# Patient Record
Sex: FEMALE | Race: WHITE | NOT HISPANIC OR LATINO | Employment: OTHER | URBAN - METROPOLITAN AREA
[De-identification: names, ages, dates, MRNs, and addresses within clinical notes are randomized per-mention and may not be internally consistent; named-entity substitution may affect disease eponyms.]

---

## 2017-01-16 ENCOUNTER — ALLSCRIPTS OFFICE VISIT (OUTPATIENT)
Dept: OTHER | Facility: OTHER | Age: 82
End: 2017-01-16

## 2017-02-04 ENCOUNTER — ALLSCRIPTS OFFICE VISIT (OUTPATIENT)
Dept: OTHER | Facility: OTHER | Age: 82
End: 2017-02-04

## 2017-02-24 ENCOUNTER — TRANSCRIBE ORDERS (OUTPATIENT)
Dept: ADMINISTRATIVE | Facility: HOSPITAL | Age: 82
End: 2017-02-24

## 2017-02-24 ENCOUNTER — ALLSCRIPTS OFFICE VISIT (OUTPATIENT)
Dept: OTHER | Facility: OTHER | Age: 82
End: 2017-02-24

## 2017-02-24 ENCOUNTER — HOSPITAL ENCOUNTER (OUTPATIENT)
Dept: RADIOLOGY | Facility: HOSPITAL | Age: 82
Discharge: HOME | End: 2017-02-24
Payer: COMMERCIAL

## 2017-02-24 DIAGNOSIS — I48.0 PAROXYSMAL ATRIAL FIBRILLATION (HCC): ICD-10-CM

## 2017-02-24 DIAGNOSIS — M54.6 PAIN IN THORACIC SPINE: ICD-10-CM

## 2017-02-24 PROCEDURE — 72070 X-RAY EXAM THORAC SPINE 2VWS: CPT

## 2017-02-27 ENCOUNTER — ALLSCRIPTS OFFICE VISIT (OUTPATIENT)
Dept: OTHER | Facility: OTHER | Age: 82
End: 2017-02-27

## 2017-03-01 ENCOUNTER — APPOINTMENT (EMERGENCY)
Dept: RADIOLOGY | Facility: HOSPITAL | Age: 82
DRG: 308 | End: 2017-03-01
Payer: COMMERCIAL

## 2017-03-01 ENCOUNTER — HOSPITAL ENCOUNTER (INPATIENT)
Facility: HOSPITAL | Age: 82
LOS: 3 days | Discharge: HOME HEALTH | DRG: 308 | End: 2017-03-04
Attending: INTERNAL MEDICINE | Admitting: INTERNAL MEDICINE
Payer: COMMERCIAL

## 2017-03-01 DIAGNOSIS — I50.32 CHRONIC DIASTOLIC CONGESTIVE HEART FAILURE (HCC): ICD-10-CM

## 2017-03-01 DIAGNOSIS — K59.00 CONSTIPATION: ICD-10-CM

## 2017-03-01 DIAGNOSIS — I48.91 ATRIAL FIBRILLATION WITH RVR (HCC): ICD-10-CM

## 2017-03-01 DIAGNOSIS — I10 ESSENTIAL HYPERTENSION WITH GOAL BLOOD PRESSURE LESS THAN 130/85: ICD-10-CM

## 2017-03-01 DIAGNOSIS — E87.1 HYPONATREMIA: Primary | ICD-10-CM

## 2017-03-01 DIAGNOSIS — I48.91 NEW ONSET A-FIB (HCC): ICD-10-CM

## 2017-03-01 PROBLEM — M48.54XA NON-TRAUMATIC COMPRESSION FRACTURE OF T10 THORACIC VERTEBRA: Status: ACTIVE | Noted: 2017-03-01

## 2017-03-01 LAB
ALBUMIN SERPL BCP-MCNC: 3.3 G/DL (ref 3.5–5)
ALP SERPL-CCNC: 98 U/L (ref 46–116)
ALT SERPL W P-5'-P-CCNC: 29 U/L (ref 12–78)
ANION GAP SERPL CALCULATED.3IONS-SCNC: 3 MMOL/L (ref 4–13)
APTT PPP: 32 SECONDS (ref 24–36)
AST SERPL W P-5'-P-CCNC: 31 U/L (ref 5–45)
BASOPHILS # BLD AUTO: 0.03 THOUSANDS/ΜL (ref 0–0.1)
BASOPHILS NFR BLD AUTO: 0 % (ref 0–1)
BILIRUB SERPL-MCNC: 0.6 MG/DL (ref 0.2–1)
BUN SERPL-MCNC: 16 MG/DL (ref 5–25)
CALCIUM SERPL-MCNC: 9.4 MG/DL (ref 8.3–10.1)
CHLORIDE SERPL-SCNC: 87 MMOL/L (ref 100–108)
CLARITY, POC: CLEAR
CO2 SERPL-SCNC: 34 MMOL/L (ref 21–32)
COLOR, POC: YELLOW
CREAT SERPL-MCNC: 0.86 MG/DL (ref 0.6–1.3)
EOSINOPHIL # BLD AUTO: 0.15 THOUSAND/ΜL (ref 0–0.61)
EOSINOPHIL NFR BLD AUTO: 2 % (ref 0–6)
ERYTHROCYTE [DISTWIDTH] IN BLOOD BY AUTOMATED COUNT: 12.7 % (ref 11.6–15.1)
EXT BILIRUBIN, UA: NEGATIVE
EXT BLOOD URINE: NEGATIVE
EXT GLUCOSE, UA: NEGATIVE
EXT KETONES: NEGATIVE
EXT NITRITE, UA: NEGATIVE
EXT PH, UA: 7.5
EXT PROTEIN, UA: NEGATIVE
EXT SPECIFIC GRAVITY, UA: 1
EXT UROBILINOGEN: 0.2
GFR SERPL CREATININE-BSD FRML MDRD: >60 ML/MIN/1.73SQ M
GLUCOSE SERPL-MCNC: 105 MG/DL (ref 65–140)
HCT VFR BLD AUTO: 37.1 % (ref 34.8–46.1)
HGB BLD-MCNC: 12.8 G/DL (ref 11.5–15.4)
INR PPP: 1.03 (ref 0.86–1.16)
LACTATE SERPL-SCNC: 1.2 MMOL/L (ref 0.5–2)
LYMPHOCYTES # BLD AUTO: 1.15 THOUSANDS/ΜL (ref 0.6–4.47)
LYMPHOCYTES NFR BLD AUTO: 16 % (ref 14–44)
MCH RBC QN AUTO: 30.4 PG (ref 26.8–34.3)
MCHC RBC AUTO-ENTMCNC: 34.5 G/DL (ref 31.4–37.4)
MCV RBC AUTO: 88 FL (ref 82–98)
MONOCYTES # BLD AUTO: 1.12 THOUSAND/ΜL (ref 0.17–1.22)
MONOCYTES NFR BLD AUTO: 16 % (ref 4–12)
NEUTROPHILS # BLD AUTO: 4.76 THOUSANDS/ΜL (ref 1.85–7.62)
NEUTS SEG NFR BLD AUTO: 66 % (ref 43–75)
NT-PROBNP SERPL-MCNC: 1286 PG/ML
PLATELET # BLD AUTO: 256 THOUSANDS/UL (ref 149–390)
PMV BLD AUTO: 10.7 FL (ref 8.9–12.7)
POTASSIUM SERPL-SCNC: 3.7 MMOL/L (ref 3.5–5.3)
PROT SERPL-MCNC: 7.2 G/DL (ref 6.4–8.2)
PROTHROMBIN TIME: 13.4 SECONDS (ref 12–14.3)
RBC # BLD AUTO: 4.21 MILLION/UL (ref 3.81–5.12)
SODIUM SERPL-SCNC: 124 MMOL/L (ref 136–145)
WBC # BLD AUTO: 7.21 THOUSAND/UL (ref 4.31–10.16)
WBC # BLD EST: NEGATIVE 10*3/UL

## 2017-03-01 PROCEDURE — A9270 NON-COVERED ITEM OR SERVICE: HCPCS | Performed by: NURSE PRACTITIONER

## 2017-03-01 PROCEDURE — 83880 ASSAY OF NATRIURETIC PEPTIDE: CPT | Performed by: PHYSICIAN ASSISTANT

## 2017-03-01 PROCEDURE — 93005 ELECTROCARDIOGRAM TRACING: CPT | Performed by: NURSE PRACTITIONER

## 2017-03-01 PROCEDURE — 99285 EMERGENCY DEPT VISIT HI MDM: CPT

## 2017-03-01 PROCEDURE — 93005 ELECTROCARDIOGRAM TRACING: CPT | Performed by: PHYSICIAN ASSISTANT

## 2017-03-01 PROCEDURE — 85610 PROTHROMBIN TIME: CPT | Performed by: PHYSICIAN ASSISTANT

## 2017-03-01 PROCEDURE — 83605 ASSAY OF LACTIC ACID: CPT | Performed by: PHYSICIAN ASSISTANT

## 2017-03-01 PROCEDURE — 84295 ASSAY OF SERUM SODIUM: CPT | Performed by: NURSE PRACTITIONER

## 2017-03-01 PROCEDURE — 80053 COMPREHEN METABOLIC PANEL: CPT | Performed by: PHYSICIAN ASSISTANT

## 2017-03-01 PROCEDURE — 96376 TX/PRO/DX INJ SAME DRUG ADON: CPT

## 2017-03-01 PROCEDURE — 81002 URINALYSIS NONAUTO W/O SCOPE: CPT | Performed by: PHYSICIAN ASSISTANT

## 2017-03-01 PROCEDURE — 74022 RADEX COMPL AQT ABD SERIES: CPT

## 2017-03-01 PROCEDURE — 85730 THROMBOPLASTIN TIME PARTIAL: CPT | Performed by: PHYSICIAN ASSISTANT

## 2017-03-01 PROCEDURE — 96365 THER/PROPH/DIAG IV INF INIT: CPT

## 2017-03-01 PROCEDURE — 36415 COLL VENOUS BLD VENIPUNCTURE: CPT | Performed by: PHYSICIAN ASSISTANT

## 2017-03-01 PROCEDURE — 85025 COMPLETE CBC W/AUTO DIFF WBC: CPT | Performed by: PHYSICIAN ASSISTANT

## 2017-03-01 RX ORDER — BRIMONIDINE TARTRATE 2 MG/ML
1 SOLUTION/ DROPS OPHTHALMIC EVERY 12 HOURS SCHEDULED
Status: DISCONTINUED | OUTPATIENT
Start: 2017-03-02 | End: 2017-03-04 | Stop reason: HOSPADM

## 2017-03-01 RX ORDER — ASPIRIN 81 MG/1
81 TABLET ORAL DAILY
Status: DISCONTINUED | OUTPATIENT
Start: 2017-03-02 | End: 2017-03-03

## 2017-03-01 RX ORDER — LEVOTHYROXINE SODIUM 0.03 MG/1
25 TABLET ORAL
Status: DISCONTINUED | OUTPATIENT
Start: 2017-03-02 | End: 2017-03-04 | Stop reason: HOSPADM

## 2017-03-01 RX ORDER — ASCORBIC ACID 500 MG
500 TABLET ORAL DAILY
Status: DISCONTINUED | OUTPATIENT
Start: 2017-03-02 | End: 2017-03-04 | Stop reason: HOSPADM

## 2017-03-01 RX ORDER — LANSOPRAZOLE 30 MG/1
30 CAPSULE, DELAYED RELEASE ORAL DAILY
COMMUNITY
End: 2018-06-18

## 2017-03-01 RX ORDER — HYDROCHLOROTHIAZIDE 12.5 MG/1
12.5 CAPSULE, GELATIN COATED ORAL
COMMUNITY
End: 2017-03-04 | Stop reason: HOSPADM

## 2017-03-01 RX ORDER — BRIMONIDINE TARTRATE, TIMOLOL MALEATE 2; 5 MG/ML; MG/ML
1 SOLUTION/ DROPS OPHTHALMIC EVERY 12 HOURS SCHEDULED
Status: DISCONTINUED | OUTPATIENT
Start: 2017-03-01 | End: 2017-03-01

## 2017-03-01 RX ORDER — TIMOLOL MALEATE 5 MG/ML
1 SOLUTION/ DROPS OPHTHALMIC EVERY 12 HOURS SCHEDULED
Status: DISCONTINUED | OUTPATIENT
Start: 2017-03-02 | End: 2017-03-04 | Stop reason: HOSPADM

## 2017-03-01 RX ORDER — DOCUSATE SODIUM 100 MG/1
100 CAPSULE, LIQUID FILLED ORAL 2 TIMES DAILY
Status: DISCONTINUED | OUTPATIENT
Start: 2017-03-01 | End: 2017-03-02

## 2017-03-01 RX ORDER — DORZOLAMIDE HCL 20 MG/ML
1 SOLUTION/ DROPS OPHTHALMIC 2 TIMES DAILY
COMMUNITY
End: 2017-03-01

## 2017-03-01 RX ORDER — SENNOSIDES 8.6 MG
2 TABLET ORAL DAILY
Status: DISCONTINUED | OUTPATIENT
Start: 2017-03-02 | End: 2017-03-02

## 2017-03-01 RX ORDER — PANTOPRAZOLE SODIUM 40 MG/1
40 TABLET, DELAYED RELEASE ORAL
Status: DISCONTINUED | OUTPATIENT
Start: 2017-03-02 | End: 2017-03-04 | Stop reason: HOSPADM

## 2017-03-01 RX ORDER — CYCLOBENZAPRINE HCL 10 MG
5 TABLET ORAL EVERY 8 HOURS PRN
Status: DISCONTINUED | OUTPATIENT
Start: 2017-03-01 | End: 2017-03-04 | Stop reason: HOSPADM

## 2017-03-01 RX ORDER — LIDOCAINE 50 MG/G
1 PATCH TOPICAL DAILY
Status: DISCONTINUED | OUTPATIENT
Start: 2017-03-02 | End: 2017-03-04 | Stop reason: HOSPADM

## 2017-03-01 RX ORDER — SODIUM CHLORIDE 9 MG/ML
75 INJECTION, SOLUTION INTRAVENOUS CONTINUOUS
Status: DISCONTINUED | OUTPATIENT
Start: 2017-03-01 | End: 2017-03-02

## 2017-03-01 RX ORDER — DILTIAZEM HYDROCHLORIDE 5 MG/ML
10 INJECTION INTRAVENOUS ONCE
Status: COMPLETED | OUTPATIENT
Start: 2017-03-01 | End: 2017-03-01

## 2017-03-01 RX ORDER — TIMOLOL MALEATE 5 MG/ML
1 SOLUTION/ DROPS OPHTHALMIC EVERY 12 HOURS SCHEDULED
Status: DISCONTINUED | OUTPATIENT
Start: 2017-03-01 | End: 2017-03-01

## 2017-03-01 RX ORDER — VALSARTAN 160 MG/1
160 TABLET ORAL DAILY
Status: DISCONTINUED | OUTPATIENT
Start: 2017-03-02 | End: 2017-03-02

## 2017-03-01 RX ORDER — ATORVASTATIN CALCIUM 40 MG/1
40 TABLET, FILM COATED ORAL
Status: DISCONTINUED | OUTPATIENT
Start: 2017-03-02 | End: 2017-03-04 | Stop reason: HOSPADM

## 2017-03-01 RX ORDER — LATANOPROST 50 UG/ML
1 SOLUTION/ DROPS OPHTHALMIC
Status: DISCONTINUED | OUTPATIENT
Start: 2017-03-02 | End: 2017-03-04 | Stop reason: HOSPADM

## 2017-03-01 RX ORDER — BRIMONIDINE TARTRATE 2 MG/ML
1 SOLUTION/ DROPS OPHTHALMIC EVERY 12 HOURS SCHEDULED
Status: DISCONTINUED | OUTPATIENT
Start: 2017-03-01 | End: 2017-03-01

## 2017-03-01 RX ORDER — LABETALOL 100 MG/1
100 TABLET, FILM COATED ORAL EVERY 12 HOURS SCHEDULED
Status: DISCONTINUED | OUTPATIENT
Start: 2017-03-01 | End: 2017-03-04 | Stop reason: HOSPADM

## 2017-03-01 RX ORDER — BISACODYL 10 MG
10 SUPPOSITORY, RECTAL RECTAL DAILY PRN
Status: DISCONTINUED | OUTPATIENT
Start: 2017-03-02 | End: 2017-03-04 | Stop reason: HOSPADM

## 2017-03-01 RX ORDER — MULTIVIT-MIN/IRON/FOLIC ACID/K 18-600-40
1000 CAPSULE ORAL DAILY
COMMUNITY
End: 2018-06-21 | Stop reason: HOSPADM

## 2017-03-01 RX ORDER — ATORVASTATIN CALCIUM 40 MG/1
40 TABLET, FILM COATED ORAL
COMMUNITY
End: 2018-03-01 | Stop reason: SDUPTHER

## 2017-03-01 RX ORDER — LATANOPROST 50 UG/ML
SOLUTION/ DROPS OPHTHALMIC
COMMUNITY

## 2017-03-01 RX ORDER — VALSARTAN 160 MG/1
160 TABLET ORAL
COMMUNITY
End: 2017-03-04 | Stop reason: HOSPADM

## 2017-03-01 RX ORDER — BRIMONIDINE TARTRATE, TIMOLOL MALEATE 2; 5 MG/ML; MG/ML
1 SOLUTION/ DROPS OPHTHALMIC EVERY 12 HOURS SCHEDULED
COMMUNITY
End: 2018-06-18

## 2017-03-01 RX ORDER — IBUPROFEN 400 MG/1
400 TABLET ORAL 2 TIMES DAILY
Status: DISCONTINUED | OUTPATIENT
Start: 2017-03-01 | End: 2017-03-03

## 2017-03-01 RX ORDER — B-COMPLEX WITH VITAMIN C
1 TABLET ORAL
Status: DISCONTINUED | OUTPATIENT
Start: 2017-03-02 | End: 2017-03-04 | Stop reason: HOSPADM

## 2017-03-01 RX ORDER — CYCLOBENZAPRINE HCL 5 MG
5 TABLET ORAL EVERY 8 HOURS PRN
COMMUNITY
End: 2017-03-20 | Stop reason: ALTCHOICE

## 2017-03-01 RX ORDER — HYDROCHLOROTHIAZIDE 25 MG/1
12.5 TABLET ORAL DAILY
Status: DISCONTINUED | OUTPATIENT
Start: 2017-03-02 | End: 2017-03-01

## 2017-03-01 RX ORDER — TRAMADOL HYDROCHLORIDE 50 MG/1
50 TABLET ORAL EVERY 6 HOURS PRN
Status: DISCONTINUED | OUTPATIENT
Start: 2017-03-01 | End: 2017-03-04 | Stop reason: HOSPADM

## 2017-03-01 RX ORDER — LABETALOL 100 MG/1
100 TABLET, FILM COATED ORAL
COMMUNITY
End: 2017-03-04 | Stop reason: HOSPADM

## 2017-03-01 RX ORDER — LATANOPROST 50 UG/ML
1 SOLUTION/ DROPS OPHTHALMIC
Status: DISCONTINUED | OUTPATIENT
Start: 2017-03-01 | End: 2017-03-01

## 2017-03-01 RX ORDER — ACETAMINOPHEN 325 MG/1
650 TABLET ORAL EVERY 6 HOURS PRN
Status: DISCONTINUED | OUTPATIENT
Start: 2017-03-01 | End: 2017-03-04 | Stop reason: HOSPADM

## 2017-03-01 RX ORDER — BISACODYL 10 MG
10 SUPPOSITORY, RECTAL RECTAL ONCE
Status: COMPLETED | OUTPATIENT
Start: 2017-03-01 | End: 2017-03-01

## 2017-03-01 RX ADMIN — DILTIAZEM HYDROCHLORIDE 5 MG/HR: 5 INJECTION INTRAVENOUS at 18:32

## 2017-03-01 RX ADMIN — LATANOPROST 1 DROP: 50 SOLUTION OPHTHALMIC at 21:37

## 2017-03-01 RX ADMIN — BRIMONIDINE TARTRATE 1 DROP: 2 SOLUTION/ DROPS OPHTHALMIC at 21:38

## 2017-03-01 RX ADMIN — LABETALOL HYDROCHLORIDE 100 MG: 100 TABLET, FILM COATED ORAL at 21:13

## 2017-03-01 RX ADMIN — IBUPROFEN 400 MG: 400 TABLET ORAL at 21:13

## 2017-03-01 RX ADMIN — BISACODYL 10 MG: 10 SUPPOSITORY RECTAL at 21:33

## 2017-03-01 RX ADMIN — DILTIAZEM HYDROCHLORIDE 10 MG: 5 INJECTION INTRAVENOUS at 18:23

## 2017-03-01 RX ADMIN — DOCUSATE SODIUM 100 MG: 100 CAPSULE, LIQUID FILLED ORAL at 21:13

## 2017-03-01 RX ADMIN — SODIUM CHLORIDE 75 ML/HR: 0.9 INJECTION, SOLUTION INTRAVENOUS at 21:13

## 2017-03-01 RX ADMIN — SODIUM CHLORIDE 500 ML: 0.9 INJECTION, SOLUTION INTRAVENOUS at 18:41

## 2017-03-01 RX ADMIN — TIMOLOL MALEATE 1 DROP: 5 SOLUTION/ DROPS OPHTHALMIC at 21:38

## 2017-03-02 ENCOUNTER — APPOINTMENT (INPATIENT)
Dept: NON INVASIVE DIAGNOSTICS | Facility: HOSPITAL | Age: 82
DRG: 308 | End: 2017-03-02
Payer: COMMERCIAL

## 2017-03-02 ENCOUNTER — GENERIC CONVERSION - ENCOUNTER (OUTPATIENT)
Dept: OTHER | Facility: OTHER | Age: 82
End: 2017-03-02

## 2017-03-02 PROBLEM — E87.6 HYPOKALEMIA: Status: ACTIVE | Noted: 2017-03-02

## 2017-03-02 LAB
ANION GAP SERPL CALCULATED.3IONS-SCNC: 6 MMOL/L (ref 4–13)
ATRIAL RATE: 62 BPM
BUN SERPL-MCNC: 10 MG/DL (ref 5–25)
CALCIUM SERPL-MCNC: 8.3 MG/DL (ref 8.3–10.1)
CHLORIDE SERPL-SCNC: 94 MMOL/L (ref 100–108)
CO2 SERPL-SCNC: 32 MMOL/L (ref 21–32)
CREAT SERPL-MCNC: 0.55 MG/DL (ref 0.6–1.3)
ERYTHROCYTE [DISTWIDTH] IN BLOOD BY AUTOMATED COUNT: 13 % (ref 11.6–15.1)
GFR SERPL CREATININE-BSD FRML MDRD: >60 ML/MIN/1.73SQ M
GLUCOSE SERPL-MCNC: 92 MG/DL (ref 65–140)
HCT VFR BLD AUTO: 35.2 % (ref 34.8–46.1)
HGB BLD-MCNC: 11.8 G/DL (ref 11.5–15.4)
MAGNESIUM SERPL-MCNC: 1.2 MG/DL (ref 1.6–2.6)
MCH RBC QN AUTO: 29.7 PG (ref 26.8–34.3)
MCHC RBC AUTO-ENTMCNC: 33.5 G/DL (ref 31.4–37.4)
MCV RBC AUTO: 89 FL (ref 82–98)
OSMOLALITY UR/SERPL-RTO: 266 MMOL/KG (ref 282–298)
P AXIS: 29 DEGREES
PLATELET # BLD AUTO: 233 THOUSANDS/UL (ref 149–390)
PMV BLD AUTO: 9.8 FL (ref 8.9–12.7)
POTASSIUM SERPL-SCNC: 3.1 MMOL/L (ref 3.5–5.3)
PR INTERVAL: 142 MS
QRS AXIS: 17 DEGREES
QRSD INTERVAL: 84 MS
QT INTERVAL: 422 MS
QTC INTERVAL: 428 MS
RBC # BLD AUTO: 3.97 MILLION/UL (ref 3.81–5.12)
SODIUM SERPL-SCNC: 129 MMOL/L (ref 136–145)
SODIUM SERPL-SCNC: 130 MMOL/L (ref 136–145)
SODIUM SERPL-SCNC: 130 MMOL/L (ref 136–145)
SODIUM SERPL-SCNC: 132 MMOL/L (ref 136–145)
T WAVE AXIS: -13 DEGREES
T4 SERPL-MCNC: 9.3 UG/DL (ref 4.7–13.3)
TSH SERPL DL<=0.05 MIU/L-ACNC: 7.75 UIU/ML (ref 0.36–3.74)
VENTRICULAR RATE: 62 BPM
WBC # BLD AUTO: 11.48 THOUSAND/UL (ref 4.31–10.16)

## 2017-03-02 PROCEDURE — A9270 NON-COVERED ITEM OR SERVICE: HCPCS | Performed by: NURSE PRACTITIONER

## 2017-03-02 PROCEDURE — A9270 NON-COVERED ITEM OR SERVICE: HCPCS | Performed by: INTERNAL MEDICINE

## 2017-03-02 PROCEDURE — 93005 ELECTROCARDIOGRAM TRACING: CPT

## 2017-03-02 PROCEDURE — 83735 ASSAY OF MAGNESIUM: CPT | Performed by: NURSE PRACTITIONER

## 2017-03-02 PROCEDURE — 85027 COMPLETE CBC AUTOMATED: CPT | Performed by: NURSE PRACTITIONER

## 2017-03-02 PROCEDURE — 84443 ASSAY THYROID STIM HORMONE: CPT | Performed by: INTERNAL MEDICINE

## 2017-03-02 PROCEDURE — 83930 ASSAY OF BLOOD OSMOLALITY: CPT | Performed by: NURSE PRACTITIONER

## 2017-03-02 PROCEDURE — 84436 ASSAY OF TOTAL THYROXINE: CPT | Performed by: INTERNAL MEDICINE

## 2017-03-02 PROCEDURE — 80048 BASIC METABOLIC PNL TOTAL CA: CPT | Performed by: NURSE PRACTITIONER

## 2017-03-02 PROCEDURE — 84295 ASSAY OF SERUM SODIUM: CPT | Performed by: NURSE PRACTITIONER

## 2017-03-02 PROCEDURE — 93306 TTE W/DOPPLER COMPLETE: CPT

## 2017-03-02 RX ORDER — DILTIAZEM HYDROCHLORIDE 60 MG/1
60 TABLET, FILM COATED ORAL EVERY 8 HOURS SCHEDULED
Status: DISCONTINUED | OUTPATIENT
Start: 2017-03-02 | End: 2017-03-02

## 2017-03-02 RX ORDER — VALSARTAN 80 MG/1
80 TABLET ORAL DAILY
Status: DISCONTINUED | OUTPATIENT
Start: 2017-03-03 | End: 2017-03-04 | Stop reason: HOSPADM

## 2017-03-02 RX ORDER — POTASSIUM CHLORIDE 20 MEQ/1
40 TABLET, EXTENDED RELEASE ORAL ONCE
Status: COMPLETED | OUTPATIENT
Start: 2017-03-02 | End: 2017-03-02

## 2017-03-02 RX ORDER — MAGNESIUM SULFATE HEPTAHYDRATE 40 MG/ML
4 INJECTION, SOLUTION INTRAVENOUS ONCE
Status: COMPLETED | OUTPATIENT
Start: 2017-03-02 | End: 2017-03-02

## 2017-03-02 RX ORDER — POTASSIUM CHLORIDE 20 MEQ/1
20 TABLET, EXTENDED RELEASE ORAL 2 TIMES DAILY WITH MEALS
Status: DISCONTINUED | OUTPATIENT
Start: 2017-03-02 | End: 2017-03-03

## 2017-03-02 RX ORDER — POTASSIUM CHLORIDE 14.9 MG/ML
20 INJECTION INTRAVENOUS ONCE
Status: COMPLETED | OUTPATIENT
Start: 2017-03-02 | End: 2017-03-03

## 2017-03-02 RX ORDER — POTASSIUM CHLORIDE 14.9 MG/ML
20 INJECTION INTRAVENOUS ONCE
Status: COMPLETED | OUTPATIENT
Start: 2017-03-02 | End: 2017-03-02

## 2017-03-02 RX ADMIN — ASPIRIN 81 MG: 81 TABLET, COATED ORAL at 09:47

## 2017-03-02 RX ADMIN — ATORVASTATIN CALCIUM 40 MG: 40 TABLET, FILM COATED ORAL at 17:13

## 2017-03-02 RX ADMIN — DILTIAZEM HYDROCHLORIDE 60 MG: 60 TABLET, FILM COATED ORAL at 00:58

## 2017-03-02 RX ADMIN — CALCIUM CARBONATE 500 MG (1,250 MG)-VITAMIN D3 200 UNIT TABLET 1 TABLET: at 09:47

## 2017-03-02 RX ADMIN — LABETALOL HYDROCHLORIDE 100 MG: 100 TABLET, FILM COATED ORAL at 09:45

## 2017-03-02 RX ADMIN — BRIMONIDINE TARTRATE 1 DROP: 2 SOLUTION/ DROPS OPHTHALMIC at 09:53

## 2017-03-02 RX ADMIN — LIDOCAINE 1 PATCH: 50 PATCH CUTANEOUS at 21:51

## 2017-03-02 RX ADMIN — TIMOLOL MALEATE 1 DROP: 5 SOLUTION/ DROPS OPHTHALMIC at 09:53

## 2017-03-02 RX ADMIN — APIXABAN 5 MG: 5 TABLET, FILM COATED ORAL at 17:11

## 2017-03-02 RX ADMIN — DILTIAZEM HYDROCHLORIDE 60 MG: 60 TABLET, FILM COATED ORAL at 09:46

## 2017-03-02 RX ADMIN — PANTOPRAZOLE SODIUM 40 MG: 40 TABLET, DELAYED RELEASE ORAL at 09:46

## 2017-03-02 RX ADMIN — DILTIAZEM HYDROCHLORIDE 30 MG: 30 TABLET, FILM COATED ORAL at 17:12

## 2017-03-02 RX ADMIN — MAGNESIUM SULFATE IN WATER 4 G: 40 INJECTION, SOLUTION INTRAVENOUS at 09:47

## 2017-03-02 RX ADMIN — ENOXAPARIN SODIUM 40 MG: 40 INJECTION SUBCUTANEOUS at 09:47

## 2017-03-02 RX ADMIN — LATANOPROST 1 DROP: 50 SOLUTION/ DROPS OPHTHALMIC at 21:32

## 2017-03-02 RX ADMIN — APIXABAN 5 MG: 5 TABLET, FILM COATED ORAL at 09:47

## 2017-03-02 RX ADMIN — LEVOTHYROXINE SODIUM 25 MCG: 25 TABLET ORAL at 12:13

## 2017-03-02 RX ADMIN — POTASSIUM CHLORIDE 20 MEQ: 200 INJECTION, SOLUTION INTRAVENOUS at 09:48

## 2017-03-02 RX ADMIN — LABETALOL HYDROCHLORIDE 100 MG: 100 TABLET, FILM COATED ORAL at 21:33

## 2017-03-02 RX ADMIN — IBUPROFEN 400 MG: 400 TABLET ORAL at 17:15

## 2017-03-02 RX ADMIN — IBUPROFEN 400 MG: 400 TABLET ORAL at 09:46

## 2017-03-02 RX ADMIN — POTASSIUM CHLORIDE 20 MEQ: 1500 TABLET, EXTENDED RELEASE ORAL at 17:13

## 2017-03-02 RX ADMIN — TIMOLOL MALEATE 1 DROP: 5 SOLUTION/ DROPS OPHTHALMIC at 21:30

## 2017-03-02 RX ADMIN — VALSARTAN 160 MG: 160 TABLET, FILM COATED ORAL at 09:46

## 2017-03-02 RX ADMIN — POTASSIUM CHLORIDE 20 MEQ: 200 INJECTION, SOLUTION INTRAVENOUS at 12:14

## 2017-03-02 RX ADMIN — BRIMONIDINE TARTRATE 1 DROP: 2 SOLUTION/ DROPS OPHTHALMIC at 21:31

## 2017-03-02 RX ADMIN — OXYCODONE HYDROCHLORIDE AND ACETAMINOPHEN 500 MG: 500 TABLET ORAL at 09:47

## 2017-03-02 RX ADMIN — POTASSIUM CHLORIDE 40 MEQ: 1500 TABLET, EXTENDED RELEASE ORAL at 09:46

## 2017-03-02 RX ADMIN — LIDOCAINE 1 PATCH: 50 PATCH CUTANEOUS at 09:50

## 2017-03-03 ENCOUNTER — APPOINTMENT (INPATIENT)
Dept: PHYSICAL THERAPY | Facility: HOSPITAL | Age: 82
DRG: 308 | End: 2017-03-03
Payer: COMMERCIAL

## 2017-03-03 PROBLEM — I50.32 CHRONIC DIASTOLIC CONGESTIVE HEART FAILURE (HCC): Status: ACTIVE | Noted: 2017-03-03

## 2017-03-03 PROBLEM — I10 ESSENTIAL HYPERTENSION: Status: ACTIVE | Noted: 2017-03-03

## 2017-03-03 LAB
ANION GAP SERPL CALCULATED.3IONS-SCNC: 7 MMOL/L (ref 4–13)
ATRIAL RATE: 141 BPM
ATRIAL RATE: 288 BPM
ATRIAL RATE: 44 BPM
ATRIAL RATE: 64 BPM
BUN SERPL-MCNC: 18 MG/DL (ref 5–25)
CALCIUM SERPL-MCNC: 8.9 MG/DL (ref 8.3–10.1)
CHLORIDE SERPL-SCNC: 95 MMOL/L (ref 100–108)
CO2 SERPL-SCNC: 28 MMOL/L (ref 21–32)
CREAT SERPL-MCNC: 0.74 MG/DL (ref 0.6–1.3)
ERYTHROCYTE [DISTWIDTH] IN BLOOD BY AUTOMATED COUNT: 13.3 % (ref 11.6–15.1)
GFR SERPL CREATININE-BSD FRML MDRD: >60 ML/MIN/1.73SQ M
GLUCOSE SERPL-MCNC: 116 MG/DL (ref 65–140)
HCT VFR BLD AUTO: 36.2 % (ref 34.8–46.1)
HGB BLD-MCNC: 11.8 G/DL (ref 11.5–15.4)
MCH RBC QN AUTO: 29.1 PG (ref 26.8–34.3)
MCHC RBC AUTO-ENTMCNC: 32.6 G/DL (ref 31.4–37.4)
MCV RBC AUTO: 89 FL (ref 82–98)
P AXIS: 37 DEGREES
P AXIS: 76 DEGREES
PLATELET # BLD AUTO: 244 THOUSANDS/UL (ref 149–390)
PMV BLD AUTO: 10.1 FL (ref 8.9–12.7)
POTASSIUM SERPL-SCNC: 5 MMOL/L (ref 3.5–5.3)
PR INTERVAL: 146 MS
PR INTERVAL: 162 MS
QRS AXIS: 106 DEGREES
QRS AXIS: 11 DEGREES
QRS AXIS: 3 DEGREES
QRS AXIS: 87 DEGREES
QRSD INTERVAL: 76 MS
QRSD INTERVAL: 76 MS
QRSD INTERVAL: 82 MS
QRSD INTERVAL: 84 MS
QT INTERVAL: 292 MS
QT INTERVAL: 324 MS
QT INTERVAL: 416 MS
QT INTERVAL: 454 MS
QTC INTERVAL: 388 MS
QTC INTERVAL: 429 MS
QTC INTERVAL: 432 MS
QTC INTERVAL: 459 MS
RBC # BLD AUTO: 4.06 MILLION/UL (ref 3.81–5.12)
SODIUM SERPL-SCNC: 130 MMOL/L (ref 136–145)
T WAVE AXIS: -2 DEGREES
T WAVE AXIS: 20 DEGREES
T WAVE AXIS: 204 DEGREES
T WAVE AXIS: 5 DEGREES
VENTRICULAR RATE: 107 BPM
VENTRICULAR RATE: 149 BPM
VENTRICULAR RATE: 44 BPM
VENTRICULAR RATE: 64 BPM
WBC # BLD AUTO: 8.83 THOUSAND/UL (ref 4.31–10.16)

## 2017-03-03 PROCEDURE — G8979 MOBILITY GOAL STATUS: HCPCS | Performed by: PHYSICAL THERAPIST

## 2017-03-03 PROCEDURE — A9270 NON-COVERED ITEM OR SERVICE: HCPCS | Performed by: INTERNAL MEDICINE

## 2017-03-03 PROCEDURE — A9270 NON-COVERED ITEM OR SERVICE: HCPCS | Performed by: NURSE PRACTITIONER

## 2017-03-03 PROCEDURE — 97163 PT EVAL HIGH COMPLEX 45 MIN: CPT | Performed by: PHYSICAL THERAPIST

## 2017-03-03 PROCEDURE — 85027 COMPLETE CBC AUTOMATED: CPT | Performed by: INTERNAL MEDICINE

## 2017-03-03 PROCEDURE — 93005 ELECTROCARDIOGRAM TRACING: CPT

## 2017-03-03 PROCEDURE — G8978 MOBILITY CURRENT STATUS: HCPCS | Performed by: PHYSICAL THERAPIST

## 2017-03-03 PROCEDURE — 80048 BASIC METABOLIC PNL TOTAL CA: CPT | Performed by: INTERNAL MEDICINE

## 2017-03-03 PROCEDURE — G8980 MOBILITY D/C STATUS: HCPCS | Performed by: PHYSICAL THERAPIST

## 2017-03-03 RX ORDER — DILTIAZEM HYDROCHLORIDE 120 MG/1
120 CAPSULE, COATED, EXTENDED RELEASE ORAL DAILY
Status: DISCONTINUED | OUTPATIENT
Start: 2017-03-03 | End: 2017-03-04 | Stop reason: HOSPADM

## 2017-03-03 RX ORDER — WARFARIN SODIUM 7.5 MG/1
7.5 TABLET ORAL
Status: COMPLETED | OUTPATIENT
Start: 2017-03-03 | End: 2017-03-03

## 2017-03-03 RX ADMIN — BRIMONIDINE TARTRATE 1 DROP: 2 SOLUTION/ DROPS OPHTHALMIC at 08:08

## 2017-03-03 RX ADMIN — LIDOCAINE 1 PATCH: 50 PATCH CUTANEOUS at 11:54

## 2017-03-03 RX ADMIN — ASPIRIN 81 MG: 81 TABLET, COATED ORAL at 08:08

## 2017-03-03 RX ADMIN — DILTIAZEM HYDROCHLORIDE 30 MG: 30 TABLET, FILM COATED ORAL at 00:09

## 2017-03-03 RX ADMIN — LATANOPROST 1 DROP: 50 SOLUTION/ DROPS OPHTHALMIC at 21:42

## 2017-03-03 RX ADMIN — IBUPROFEN 400 MG: 400 TABLET ORAL at 08:08

## 2017-03-03 RX ADMIN — VALSARTAN 80 MG: 80 TABLET ORAL at 08:07

## 2017-03-03 RX ADMIN — BRIMONIDINE TARTRATE 1 DROP: 2 SOLUTION/ DROPS OPHTHALMIC at 21:42

## 2017-03-03 RX ADMIN — PANTOPRAZOLE SODIUM 40 MG: 40 TABLET, DELAYED RELEASE ORAL at 05:54

## 2017-03-03 RX ADMIN — WARFARIN SODIUM 7.5 MG: 7.5 TABLET ORAL at 17:10

## 2017-03-03 RX ADMIN — OXYCODONE HYDROCHLORIDE AND ACETAMINOPHEN 500 MG: 500 TABLET ORAL at 08:08

## 2017-03-03 RX ADMIN — LEVOTHYROXINE SODIUM 25 MCG: 25 TABLET ORAL at 05:54

## 2017-03-03 RX ADMIN — APIXABAN 5 MG: 5 TABLET, FILM COATED ORAL at 08:07

## 2017-03-03 RX ADMIN — ATORVASTATIN CALCIUM 40 MG: 40 TABLET, FILM COATED ORAL at 17:10

## 2017-03-03 RX ADMIN — TIMOLOL MALEATE 1 DROP: 5 SOLUTION/ DROPS OPHTHALMIC at 21:42

## 2017-03-03 RX ADMIN — CALCIUM CARBONATE 500 MG (1,250 MG)-VITAMIN D3 200 UNIT TABLET 1 TABLET: at 08:08

## 2017-03-03 RX ADMIN — TIMOLOL MALEATE 1 DROP: 5 SOLUTION/ DROPS OPHTHALMIC at 08:08

## 2017-03-03 RX ADMIN — LABETALOL HYDROCHLORIDE 100 MG: 100 TABLET, FILM COATED ORAL at 08:07

## 2017-03-03 RX ADMIN — NIFEDIPINE 120 MG: 10 CAPSULE ORAL at 10:59

## 2017-03-03 RX ADMIN — LABETALOL HYDROCHLORIDE 100 MG: 100 TABLET, FILM COATED ORAL at 21:41

## 2017-03-03 RX ADMIN — ENOXAPARIN SODIUM 40 MG: 40 INJECTION SUBCUTANEOUS at 08:08

## 2017-03-03 RX ADMIN — DILTIAZEM HYDROCHLORIDE 30 MG: 30 TABLET, FILM COATED ORAL at 05:54

## 2017-03-04 VITALS
OXYGEN SATURATION: 91 % | DIASTOLIC BLOOD PRESSURE: 60 MMHG | HEART RATE: 61 BPM | RESPIRATION RATE: 20 BRPM | HEIGHT: 65 IN | BODY MASS INDEX: 22.04 KG/M2 | SYSTOLIC BLOOD PRESSURE: 132 MMHG | WEIGHT: 132.28 LBS | TEMPERATURE: 96.5 F

## 2017-03-04 PROBLEM — G93.41 ACUTE METABOLIC ENCEPHALOPATHY: Status: ACTIVE | Noted: 2017-03-04

## 2017-03-04 LAB
INR PPP: 1.12 (ref 0.86–1.16)
PROTHROMBIN TIME: 14.3 SECONDS (ref 12–14.3)

## 2017-03-04 PROCEDURE — A9270 NON-COVERED ITEM OR SERVICE: HCPCS | Performed by: NURSE PRACTITIONER

## 2017-03-04 PROCEDURE — A9270 NON-COVERED ITEM OR SERVICE: HCPCS | Performed by: INTERNAL MEDICINE

## 2017-03-04 PROCEDURE — 85610 PROTHROMBIN TIME: CPT | Performed by: INTERNAL MEDICINE

## 2017-03-04 PROCEDURE — 93005 ELECTROCARDIOGRAM TRACING: CPT

## 2017-03-04 RX ORDER — LIDOCAINE 50 MG/G
1 PATCH TOPICAL DAILY
Qty: 30 PATCH | Refills: 0 | Status: SHIPPED | OUTPATIENT
Start: 2017-03-04 | End: 2018-06-18

## 2017-03-04 RX ORDER — LABETALOL 100 MG/1
100 TABLET, FILM COATED ORAL EVERY 12 HOURS SCHEDULED
Qty: 60 TABLET | Refills: 0 | Status: SHIPPED | OUTPATIENT
Start: 2017-03-04 | End: 2018-04-02 | Stop reason: CLARIF

## 2017-03-04 RX ORDER — QUETIAPINE FUMARATE 25 MG/1
25 TABLET, FILM COATED ORAL 2 TIMES DAILY
Qty: 60 TABLET | Refills: 0 | Status: SHIPPED | OUTPATIENT
Start: 2017-03-04 | End: 2018-05-18 | Stop reason: SDUPTHER

## 2017-03-04 RX ORDER — VALSARTAN 80 MG/1
80 TABLET ORAL DAILY
Qty: 30 TABLET | Refills: 0 | Status: SHIPPED | OUTPATIENT
Start: 2017-03-04 | End: 2018-04-02 | Stop reason: ALTCHOICE

## 2017-03-04 RX ORDER — ASPIRIN 325 MG
325 TABLET ORAL DAILY
Status: DISCONTINUED | OUTPATIENT
Start: 2017-03-04 | End: 2017-03-04 | Stop reason: HOSPADM

## 2017-03-04 RX ORDER — HALOPERIDOL 1 MG/1
0.5 TABLET ORAL EVERY 8 HOURS PRN
Status: DISCONTINUED | OUTPATIENT
Start: 2017-03-04 | End: 2017-03-04 | Stop reason: HOSPADM

## 2017-03-04 RX ORDER — ASPIRIN 325 MG
325 TABLET ORAL DAILY
Qty: 30 TABLET | Refills: 0 | Status: SHIPPED | OUTPATIENT
Start: 2017-03-04 | End: 2018-04-02 | Stop reason: CLARIF

## 2017-03-04 RX ORDER — MORPHINE SULFATE 2 MG/ML
1 INJECTION, SOLUTION INTRAMUSCULAR; INTRAVENOUS EVERY 4 HOURS PRN
Status: DISCONTINUED | OUTPATIENT
Start: 2017-03-04 | End: 2017-03-04 | Stop reason: HOSPADM

## 2017-03-04 RX ORDER — DILTIAZEM HYDROCHLORIDE 120 MG/1
120 CAPSULE, COATED, EXTENDED RELEASE ORAL DAILY
Qty: 30 CAPSULE | Refills: 0 | Status: SHIPPED | OUTPATIENT
Start: 2017-03-04 | End: 2018-04-02 | Stop reason: CLARIF

## 2017-03-04 RX ORDER — QUETIAPINE FUMARATE 25 MG/1
25 TABLET, FILM COATED ORAL 2 TIMES DAILY
Status: DISCONTINUED | OUTPATIENT
Start: 2017-03-04 | End: 2017-03-04 | Stop reason: HOSPADM

## 2017-03-04 RX ADMIN — CALCIUM CARBONATE 500 MG (1,250 MG)-VITAMIN D3 200 UNIT TABLET 1 TABLET: at 16:00

## 2017-03-04 RX ADMIN — BRIMONIDINE TARTRATE 1 DROP: 2 SOLUTION/ DROPS OPHTHALMIC at 12:16

## 2017-03-04 RX ADMIN — NIFEDIPINE 120 MG: 10 CAPSULE ORAL at 16:01

## 2017-03-04 RX ADMIN — TIMOLOL MALEATE 1 DROP: 5 SOLUTION/ DROPS OPHTHALMIC at 12:15

## 2017-03-04 RX ADMIN — ASPIRIN 325 MG ORAL TABLET 325 MG: 325 PILL ORAL at 16:01

## 2017-03-04 RX ADMIN — ATORVASTATIN CALCIUM 40 MG: 40 TABLET, FILM COATED ORAL at 16:01

## 2017-03-04 RX ADMIN — TRAMADOL HYDROCHLORIDE 50 MG: 50 TABLET, COATED ORAL at 01:04

## 2017-03-04 RX ADMIN — QUETIAPINE FUMARATE 25 MG: 25 TABLET, FILM COATED ORAL at 06:12

## 2017-03-04 RX ADMIN — VALSARTAN 80 MG: 80 TABLET ORAL at 16:01

## 2017-03-04 RX ADMIN — MORPHINE SULFATE 1 MG: 2 INJECTION, SOLUTION INTRAMUSCULAR; INTRAVENOUS at 02:44

## 2017-03-04 RX ADMIN — LIDOCAINE 1 PATCH: 50 PATCH CUTANEOUS at 12:15

## 2017-03-04 RX ADMIN — LABETALOL HYDROCHLORIDE 100 MG: 100 TABLET, FILM COATED ORAL at 16:01

## 2017-03-04 RX ADMIN — OXYCODONE HYDROCHLORIDE AND ACETAMINOPHEN 500 MG: 500 TABLET ORAL at 16:02

## 2017-03-04 RX ADMIN — LEVOTHYROXINE SODIUM 25 MCG: 25 TABLET ORAL at 05:22

## 2017-03-04 RX ADMIN — MORPHINE SULFATE 1 MG: 2 INJECTION, SOLUTION INTRAMUSCULAR; INTRAVENOUS at 05:22

## 2017-03-04 RX ADMIN — PANTOPRAZOLE SODIUM 40 MG: 40 TABLET, DELAYED RELEASE ORAL at 05:22

## 2017-03-06 LAB
ATRIAL RATE: 75 BPM
P AXIS: 53 DEGREES
PR INTERVAL: 152 MS
QRS AXIS: 54 DEGREES
QRSD INTERVAL: 82 MS
QT INTERVAL: 408 MS
QTC INTERVAL: 455 MS
T WAVE AXIS: 37 DEGREES
VENTRICULAR RATE: 75 BPM

## 2017-03-08 ENCOUNTER — GENERIC CONVERSION - ENCOUNTER (OUTPATIENT)
Dept: OTHER | Facility: OTHER | Age: 82
End: 2017-03-08

## 2017-03-16 ENCOUNTER — ALLSCRIPTS OFFICE VISIT (OUTPATIENT)
Dept: OTHER | Facility: OTHER | Age: 82
End: 2017-03-16

## 2017-03-20 ENCOUNTER — HOSPITAL ENCOUNTER (EMERGENCY)
Facility: HOSPITAL | Age: 82
Discharge: HOME | End: 2017-03-20
Attending: EMERGENCY MEDICINE | Admitting: EMERGENCY MEDICINE
Payer: COMMERCIAL

## 2017-03-20 VITALS
RESPIRATION RATE: 20 BRPM | WEIGHT: 130 LBS | OXYGEN SATURATION: 93 % | HEART RATE: 71 BPM | DIASTOLIC BLOOD PRESSURE: 73 MMHG | TEMPERATURE: 97.9 F | HEIGHT: 65 IN | BODY MASS INDEX: 21.66 KG/M2 | SYSTOLIC BLOOD PRESSURE: 157 MMHG

## 2017-03-20 DIAGNOSIS — R00.1 BRADYCARDIA: Primary | ICD-10-CM

## 2017-03-20 LAB
ANION GAP SERPL CALCULATED.3IONS-SCNC: 5 MMOL/L (ref 4–13)
ATRIAL RATE: 65 BPM
BASOPHILS # BLD AUTO: 0.05 THOUSANDS/ΜL (ref 0–0.1)
BASOPHILS NFR BLD AUTO: 1 % (ref 0–1)
BUN SERPL-MCNC: 18 MG/DL (ref 5–25)
CALCIUM SERPL-MCNC: 10 MG/DL (ref 8.3–10.1)
CHLORIDE SERPL-SCNC: 100 MMOL/L (ref 100–108)
CO2 SERPL-SCNC: 33 MMOL/L (ref 21–32)
CREAT SERPL-MCNC: 0.86 MG/DL (ref 0.6–1.3)
EOSINOPHIL # BLD AUTO: 0.29 THOUSAND/ΜL (ref 0–0.61)
EOSINOPHIL NFR BLD AUTO: 4 % (ref 0–6)
ERYTHROCYTE [DISTWIDTH] IN BLOOD BY AUTOMATED COUNT: 14.2 % (ref 11.6–15.1)
GFR SERPL CREATININE-BSD FRML MDRD: >60 ML/MIN/1.73SQ M
GLUCOSE SERPL-MCNC: 103 MG/DL (ref 65–140)
HCT VFR BLD AUTO: 37.3 % (ref 34.8–46.1)
HGB BLD-MCNC: 12 G/DL (ref 11.5–15.4)
LYMPHOCYTES # BLD AUTO: 1.19 THOUSANDS/ΜL (ref 0.6–4.47)
LYMPHOCYTES NFR BLD AUTO: 16 % (ref 14–44)
MCH RBC QN AUTO: 29.7 PG (ref 26.8–34.3)
MCHC RBC AUTO-ENTMCNC: 32.2 G/DL (ref 31.4–37.4)
MCV RBC AUTO: 92 FL (ref 82–98)
MONOCYTES # BLD AUTO: 0.89 THOUSAND/ΜL (ref 0.17–1.22)
MONOCYTES NFR BLD AUTO: 12 % (ref 4–12)
NEUTROPHILS # BLD AUTO: 5.1 THOUSANDS/ΜL (ref 1.85–7.62)
NEUTS SEG NFR BLD AUTO: 67 % (ref 43–75)
P AXIS: 61 DEGREES
PLATELET # BLD AUTO: 279 THOUSANDS/UL (ref 149–390)
PMV BLD AUTO: 10.7 FL (ref 8.9–12.7)
POTASSIUM SERPL-SCNC: 3.7 MMOL/L (ref 3.5–5.3)
PR INTERVAL: 138 MS
QRS AXIS: 44 DEGREES
QRSD INTERVAL: 82 MS
QT INTERVAL: 396 MS
QTC INTERVAL: 411 MS
RBC # BLD AUTO: 4.04 MILLION/UL (ref 3.81–5.12)
SODIUM SERPL-SCNC: 138 MMOL/L (ref 136–145)
T WAVE AXIS: 47 DEGREES
VENTRICULAR RATE: 65 BPM
WBC # BLD AUTO: 7.52 THOUSAND/UL (ref 4.31–10.16)

## 2017-03-20 PROCEDURE — 85025 COMPLETE CBC W/AUTO DIFF WBC: CPT | Performed by: EMERGENCY MEDICINE

## 2017-03-20 PROCEDURE — 36415 COLL VENOUS BLD VENIPUNCTURE: CPT | Performed by: EMERGENCY MEDICINE

## 2017-03-20 PROCEDURE — 93005 ELECTROCARDIOGRAM TRACING: CPT | Performed by: EMERGENCY MEDICINE

## 2017-03-20 PROCEDURE — 80048 BASIC METABOLIC PNL TOTAL CA: CPT | Performed by: EMERGENCY MEDICINE

## 2017-03-20 PROCEDURE — 99284 EMERGENCY DEPT VISIT MOD MDM: CPT

## 2017-03-22 ENCOUNTER — HOSPITAL ENCOUNTER (OUTPATIENT)
Dept: NON INVASIVE DIAGNOSTICS | Facility: CLINIC | Age: 82
Discharge: HOME | End: 2017-03-22
Payer: COMMERCIAL

## 2017-03-22 DIAGNOSIS — I48.0 PAROXYSMAL ATRIAL FIBRILLATION (HCC): ICD-10-CM

## 2017-03-22 PROCEDURE — 93225 XTRNL ECG REC<48 HRS REC: CPT

## 2017-03-22 PROCEDURE — 93226 XTRNL ECG REC<48 HR SCAN A/R: CPT

## 2017-04-07 ENCOUNTER — ALLSCRIPTS OFFICE VISIT (OUTPATIENT)
Dept: OTHER | Facility: OTHER | Age: 82
End: 2017-04-07

## 2017-04-17 ENCOUNTER — ALLSCRIPTS OFFICE VISIT (OUTPATIENT)
Dept: OTHER | Facility: OTHER | Age: 82
End: 2017-04-17

## 2017-05-04 ENCOUNTER — ALLSCRIPTS OFFICE VISIT (OUTPATIENT)
Dept: OTHER | Facility: OTHER | Age: 82
End: 2017-05-04

## 2017-05-31 ENCOUNTER — ALLSCRIPTS OFFICE VISIT (OUTPATIENT)
Dept: OTHER | Facility: OTHER | Age: 82
End: 2017-05-31

## 2017-06-13 ENCOUNTER — GENERIC CONVERSION - ENCOUNTER (OUTPATIENT)
Dept: OTHER | Facility: OTHER | Age: 82
End: 2017-06-13

## 2017-06-14 LAB — T4 FREE SERPL-MCNC: 1.56 NG/DL (ref 0.82–1.77)

## 2017-06-21 ENCOUNTER — ALLSCRIPTS OFFICE VISIT (OUTPATIENT)
Dept: OTHER | Facility: OTHER | Age: 82
End: 2017-06-21

## 2017-07-10 ENCOUNTER — ALLSCRIPTS OFFICE VISIT (OUTPATIENT)
Dept: OTHER | Facility: OTHER | Age: 82
End: 2017-07-10

## 2017-08-25 ENCOUNTER — ALLSCRIPTS OFFICE VISIT (OUTPATIENT)
Dept: OTHER | Facility: OTHER | Age: 82
End: 2017-08-25

## 2017-09-07 ENCOUNTER — GENERIC CONVERSION - ENCOUNTER (OUTPATIENT)
Dept: OTHER | Facility: OTHER | Age: 82
End: 2017-09-07

## 2017-09-08 ENCOUNTER — GENERIC CONVERSION - ENCOUNTER (OUTPATIENT)
Dept: OTHER | Facility: OTHER | Age: 82
End: 2017-09-08

## 2017-09-18 ENCOUNTER — GENERIC CONVERSION - ENCOUNTER (OUTPATIENT)
Dept: OTHER | Facility: OTHER | Age: 82
End: 2017-09-18

## 2017-09-19 LAB
T4 FREE SERPL-MCNC: 1.95 NG/DL (ref 0.82–1.77)
TSH SERPL DL<=0.05 MIU/L-ACNC: 0.7 UIU/ML (ref 0.45–4.5)

## 2017-09-25 ENCOUNTER — GENERIC CONVERSION - ENCOUNTER (OUTPATIENT)
Dept: OTHER | Facility: OTHER | Age: 82
End: 2017-09-25

## 2017-10-20 ENCOUNTER — GENERIC CONVERSION - ENCOUNTER (OUTPATIENT)
Dept: OTHER | Facility: OTHER | Age: 82
End: 2017-10-20

## 2017-11-06 ENCOUNTER — ALLSCRIPTS OFFICE VISIT (OUTPATIENT)
Dept: OTHER | Facility: OTHER | Age: 82
End: 2017-11-06

## 2017-11-06 DIAGNOSIS — I48.0 PAROXYSMAL ATRIAL FIBRILLATION (HCC): ICD-10-CM

## 2017-11-07 NOTE — PROGRESS NOTES
Assessment  Assessed    1  Chronic diastolic CHF (congestive heart failure) (428 32,428 0) (I50 32)   2  PAF (paroxysmal atrial fibrillation) (427 31) (I48 0)    Plan  PAF (paroxysmal atrial fibrillation)    · Metoprolol Succinate ER 25 MG Oral Tablet Extended Release 24 Hour; TAKE  0 5 TABLET Every twelve hours   Rx By: Shanna Cota; Dispense: 30 Days ; #:30 Tablet Extended Release 24 Hour; Refill: 6;For: PAF (paroxysmal atrial fibrillation); GEMMA = N; Verified Transmission to [facility]; Last Updated By: System, SureScript[employer]; 11/6/2017 2:54:05 PM   · EKG/ECG- POC; Status:Complete;   Done: 27DVA6131 02:30PM   Perform: In Office; Last Updated Elliott Georges; 11/6/2017 2:30:41 PM;Ordered; For:PAF (paroxysmal atrial fibrillation); Ordered By:Sean Sahu;   · HOLTER MONITOR - 24 HOUR; Status:Hold For - Scheduling; Requested  XSD:[id number];    Perform:[facility]; JKQ:41XGV3699; Ordered; For:PAF (paroxysmal atrial fibrillation); Ordered By:Sean Sahu;   · Follow-up visit in 6 months Evaluation and Treatment  Follow-up  Status: Complete   Done: 69ZYI4669   Ordered; For: PAF (paroxysmal atrial fibrillation); Ordered By: Shanna Cota Performed:  Order Comments: PT WAIT LIST Due: 06YIM3592; Last Updated By: Elias Brandon; 11/6/2017 2:57:14 PM    Discussion/Summary  Cardiology Discussion Summary Free Text Note Form [facility]:   Paroxysmal Atrial Fibrillation: She is in atrial fibrillation today  Her heart rate is around 119 bpm  She will take Metoprolol succinate 12 5 mg twice a day  Continue Eliquis  Diastolic CHF with elevated left sided filling pressures and pulmonary HTN  Continue lasix  BP stable on medical on medical therapy  Counseling Documentation With Imm: The patient was counseled regarding diagnostic results,-- instructions for management,-- risk factor reductions,-- impressions  total time of encounter was 25 minutes-- and-- 15 minutes was spent counseling        Chief Complaint  Chief Complaint Free Text Note Form: Four month follow up with EKG      History of Present Illness  Cardiology HPI Free Text Note Form [facility]: Followup for afib    weight has been variable  Back on diltiazem  Her rates are high today  She is severely depressed  Atrial Fibrillation (Follow-Up): The patient presents with paroxysmal atrial fibrillation  The treatment strategy for this patient is rhythm control  She states her atrial fibrillation has been stable since the last visit  Symptoms: denies palpitations,-- denies chest pain,-- stable exercise intolerance,-- stable dyspnea on exertion-- and-- denies dizziness  Associated symptoms include no syncope,-- no focal neurologic deficit,-- no tendency for easy bleeding-- and-- no tendency for easy bruising  Medications: the patient is adherent with her medication regimen  -- She denies medication side effects  Review of Systems  Cardiology Female ROS:     Cardiac: rhythm problems,-- has heart murmur present-- and-- has swelling in the     Skin: No complaints of nonhealing sores or skin rash  Genitourinary: No complaints of recurrent urinary tract infections, frequent urination at night, difficult urination, blood in urine, kidney stones, loss of bladder control, kidney problems, denies any birth control or hormone replacement, is not post menopausal, not currently pregnant  Psychological: No complaints of feeling depressed, anxiety, panic attacks, or difficulty concentrating  General: lack of energy/fatigue  Respiratory: shortness of breath  HEENT: No complaints of serious problems, hearing problems, nose problems, throat problems, or snoring  Gastrointestinal: No complaints of liver problems, nausea, vomiting, heartburn, constipation, bloody stools, diarrhea, problems swallowing, adbominal pain, or rectal bleeding  Hematologic: No complaints of bleeding disorders, anemia, blood clots, or excessive brusing     Neurological: No complaints of numbness, tingling, dizziness, weakness, seizures, headaches, syncope or fainting, AM fatigue, daytime sleepiness, no witnessed apnea episodes  Musculoskeletal: arthritis   ROS Reviewed:   ROS reviewed  Active Problems  Problems    1  Actinic keratosis (702 0) (L57 0)   2  Age-related cognitive decline (294 9) (R41 81)   3  Anxiety (300 00) (F41 9)   4  Bacterial pneumonia (482 9) (J15 9)   5  Benign paroxysmal positional vertigo (386 11) (H81 10)   6  Breast cancer (174 9) (C50 919)   7  Chronic diastolic CHF (congestive heart failure) (428 32,428 0) (I50 32)   8  Compression fracture of thoracic vertebra, sequela (905 1) (S22 000S)   9  Depression with anxiety (300 4) (F41 8)   10  Dizziness (780 4) (R42)   11  Feeling weak (780 79) (R53 1)   12  Flu vaccine need (V04 81) (Z23)   13  Glaucoma (365 9) (H40 9)   14  Hyperglycemia (790 29) (R73 9)   15  Hyperlipidemia (272 4) (E78 5)   16  Hypertension (401 9) (I10)   17  Hypothyroidism (244 9) (E03 9)   18  Insomnia (780 52) (G47 00)   19  Lower leg edema (782 3) (R60 0)   20  PAF (paroxysmal atrial fibrillation) (427 31) (I48 0)   21  Postural dizziness with presyncope (780 4,780 2) (R42,R55)   22  Screening for genitourinary condition (V81 6) (Z13 89)   23  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)   24  Thoracic back pain (724 1) (M54 6)   25  Thoracic neuralgia (729 2) (M79 2)   26  Tinea pedis (110 4) (B35 3)   27  Tinea pedis of right foot (110 4) (B35 3)   28  Transient ischemic attack (TIA) (435 9) (G45 9)   29  Vitamin D deficiency (268 9) (E55 9)    Past Medical History  Problems    1  Denied: History of Alcohol abuse   2  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)   3  History of athlete's foot (V12 09) (Z86 19)   4  History of breast cancer (V10 3) (Z85 3)   5  History of pneumothorax (V12 69) (Z87 09)   6  Denied: History of substance abuse   7  Hypertension (401 9) (I10)   8   History of Tuberculosis (V12 01)   9  History of URTI (acute upper respiratory infection) (465 9) (J06 9)  Active Problems And Past Medical History Reviewed: The active problems and past medical history were reviewed and updated today  Surgical History  Problems    1  History of Breast Surgery Mastectomy   2  History of Cataract Surgery   3  History of [place] Lymph Node Biopsy   4  History of Total Abdominal Hysterectomy With Removal Of Both Ovaries  Surgical History Reviewed: The surgical history was reviewed and updated today  Family History  Mother    1  Family history of Breast Cancer (V16 3)  Father    2  Family history of Adenocarcinoma Of Large Intestine (V16 0)  Sister    3  Family history of Gastric Cancer (V16 0)  Family History    4  Denied: Family history of Alcohol abuse   5  Family history of Cancer   6  Family history of malignant neoplasm (V16 9) (Z80 9)   7  Family history of stroke (V17 1) (Z82 3)   8  Denied: Family history of substance abuse   9  Family history of thyroid disease (V18 19) (Z83 49)   10  Denied: Family history of Mental health problem  Family History Reviewed: The family history was reviewed and updated today  Social History  Problems    · Denied: History of Always uses seat belt   · Marital History - Currently    · Never a smoker   · Never Drank Alcohol  Social History Reviewed: The social history was reviewed and updated today  Current Meds   1  [employer] Vitamin C 500 MG TABS; Therapy: (Recorded:79Ayw1940) to Recorded   2  Atorvastatin Calcium 40 MG Oral Tablet; TAKE ONE TABLET BY MOUTH ONCE DAILY; Therapy: 11OVK3918 to (Simon Herrera)  Requested for: 05EQG2176; Last   Rx:09Mar2017 Ordered   3  Azithromycin 200 MG/5ML Oral Suspension Reconstituted; 2 tsps for one dose then 1 tsp   for 4 days; Therapy: 45XHP2332 to (Diomedes Marion)  Requested for: 20Oct2017 Ordered   4  Calcium 600 MG Oral Tablet;  Doesn't take during the summer time; Therapy: 37DKV4777 to Recorded   5  DilTIAZem HCl - 120 MG Oral Tablet; Therapy: 24GYK2660 to  Requested for: 45QLC4449 Recorded   6  Eliquis 2 5 MG Oral Tablet; Take 1 tablet twice daily; Therapy: 77AVR5904 to (Collin Barboza)  Requested for: 94Sgk5129; Last   Rx:52Xtj7842 Ordered   7  Furosemide 20 MG Oral Tablet; Take 1 tablet daily; Therapy: 07Apr2017 to (Evaluate:02Apr2018)  Requested for: 70Fxp8059; Last   Rx:07Apr2017 Ordered   8  Levothyroxine Sodium 125 MCG Oral Tablet; Take One tablet by mouth in the morning; Therapy: 01FGO2057 to (Last Cassia Andersen)  Requested for: 21Jun2017 Ordered   9  Metoprolol Succinate ER 25 MG Oral Tablet Extended Release 24 Hour; take 1/2 tablet   daily; Therapy: 07Apr2017 to (Evaluate:24Mar2018)  Requested for: 28YRT9252; Last   Rx:84Vnp3204 Ordered   10  Nystatin-Triamcinolone 707015-0 1 UNIT/GM-% External Cream; APPLY SPARINGLY TO    AFFECTED AREA(S) 3 TIMES A DAY; Therapy: 54Jyi1539 to ([phone])  Requested for: 10Ise1112; Last    Rx:27Jhc9758 Ordered   11  Potassium Chloride ER 20 MEQ Oral Tablet Extended Release; Take 1 tablet daily; Therapy: 49Vxq1714 to (Evaluate:24Mar2018)  Requested for: 90JLA5717; Last    Rx:65Iqa8086 Ordered   12  Sertraline HCl - 25 MG Oral Tablet; take one tablet by mouth every day; Therapy: 97PIT3576 to (VGYV:88SFB4394)  Requested for: 71RMQ1710; Last    Rx:60Ebb6484 Ordered   13  Vitamin D TABS; Therapy: ([phone]) to Recorded  Medication List Reviewed: The medication list was reviewed and updated today  Allergies  Medication    1  Corticosteroids   2  Levaquin TABS   3   Lyrica CAPS    Vitals  Vital Signs    Recorded: 93MTP8151 02:48PM Recorded: 30ECW2032 02:27PM   Heart Rate  859   Systolic 326 868   Diastolic 60 68   Height  5 ft 5 in   Weight  119 lb    BMI Calculated  19 8   BSA Calculated  1 59     Physical Exam    Constitutional   General appearance: No acute distress, well appearing and well nourished  Eyes   Conjunctiva and Sclera examination: Conjunctiva pink, sclera anicteric  Ears, Nose, Mouth, and Throat - Oropharynx: Clear, nares are clear, mucous membranes are moist    Neck   Neck and thyroid: Normal, supple, trachea midline, no thyromegaly  Pulmonary   Respiratory effort: No increased work of breathing or signs of respiratory distress  Auscultation of lungs: Clear to auscultation, no rales, no rhonchi, no wheezing, good air movement  Cardiovascular   Auscultation of heart: Abnormal  -- irregularly irregular  Carotid pulses: Normal, 2+ bilaterally  Peripheral vascular exam: Normal pulses throughout, no tenderness, erythema or swelling  Pedal pulses: Normal, 2+ bilaterally  Examination of extremities for edema and/or varicosities: Normal     Abdomen   Abdomen: Non-tender and no distention  Liver and spleen: No hepatomegaly or splenomegaly  Musculoskeletal Gait and station: Normal gait  -- Digits and nails: Normal without clubbing or cyanosis  -- Inspection/palpation of joints, bones, and muscles: Normal, ROM normal     Skin - Skin and subcutaneous tissue: Normal without rashes or lesions  Skin is warm and well perfused, normal turgor  Neurologic - Cranial nerves: II - XII intact  -- Speech: Normal     Psychiatric - Orientation to person, place, and time: Normal -- Mood and affect: Normal       Results/Data  ECG Report:   Rhythm and rate: atrial fibrillation--   ventricular rate is 119 beats per minute  QRS: the QRS is normal   T waves:   there are nonspecific ST-T wave changes  Future Appointments    Date/Time Provider Specialty Site   01/22/2018 10:30 AM SANFORD Wolfe  Hematology Oncology [facility]   05/03/2018 11:00 AM SANFORD Norris   Surgical Oncology [facility]   12/29/2017 03:00 PM Via Mik Wesley MD Family Medicine Jose Manuel Beard MD     Signatures   Electronically signed by : Thalia Felix M D ; Nov 6 2017  3:21PM EST                       (Author)

## 2017-11-09 ENCOUNTER — ALLSCRIPTS OFFICE VISIT (OUTPATIENT)
Dept: OTHER | Facility: OTHER | Age: 82
End: 2017-11-09

## 2017-11-10 NOTE — PROGRESS NOTES
Assessment    1  Blister of leg (916 2) (S80 829A)   2  Lower leg edema (782 3) (R60 0)    Discussion/Summary    Right leg blister r/t leg swelling s/s infection or ulceration  with telfa and guaze  Continue with compression stockings  infection discussed with family and to call should they occur  If area deepens should call and will need wound center  The treatment plan was reviewed with the patient/guardian  The patient/guardian understands and agrees with the treatment plan      Chief Complaint  Pt is here today for c/o cyst on leg  History of Present Illness  HPI: Yesterday noticed a blister on right lower extremity  blister broke  has been using OTC antibiotic ointment  Not painful  Draining clear  Denies fever, chills  has chronic MARCIE  Wears compression stockings  On Lasix managed by cardiology  States edema not any worse than typical       Review of Systems   Constitutional: as noted in HPI  Cardiovascular: as noted in HPI  Active Problems    1  Actinic keratosis (702 0) (L57 0)   2  Age-related cognitive decline (294 9) (R41 81)   3  Anxiety (300 00) (F41 9)   4  Bacterial pneumonia (482 9) (J15 9)   5  Benign paroxysmal positional vertigo (386 11) (H81 10)   6  Breast cancer (174 9) (C50 919)   7  Chronic diastolic CHF (congestive heart failure) (428 32,428 0) (I50 32)   8  Compression fracture of thoracic vertebra, sequela (905 1) (S22 000S)   9  Depression with anxiety (300 4) (F41 8)   10  Dizziness (780 4) (R42)   11  Feeling weak (780 79) (R53 1)   12  Flu vaccine need (V04 81) (Z23)   13  Glaucoma (365 9) (H40 9)   14  Hyperglycemia (790 29) (R73 9)   15  Hyperlipidemia (272 4) (E78 5)   16  Hypertension (401 9) (I10)   17  Hypothyroidism (244 9) (E03 9)   18  Insomnia (780 52) (G47 00)   19  Lower leg edema (782 3) (R60 0)   20  PAF (paroxysmal atrial fibrillation) (427 31) (I48 0)   21  Postural dizziness with presyncope (780 4,780 2) (R42,R55)   22   Screening for genitourinary condition (V81 6) (Z13 89)   23  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)   24  Thoracic back pain (724 1) (M54 6)   25  Thoracic neuralgia (729 2) (M79 2)   26  Tinea pedis (110 4) (B35 3)   27  Tinea pedis of right foot (110 4) (B35 3)   28  Transient ischemic attack (TIA) (435 9) (G45 9)   29  Vitamin D deficiency (268 9) (E55 9)    Past Medical History  1  Denied: History of Alcohol abuse   2  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)   3  History of athlete's foot (V12 09) (Z86 19)   4  History of breast cancer (V10 3) (Z85 3)   5  History of pneumothorax (V12 69) (Z87 09)   6  Denied: History of substance abuse   7  Hypertension (401 9) (I10)   8  History of Tuberculosis (V12 01)   9  History of URTI (acute upper respiratory infection) (465 9) (J06 9)    Family History  Mother    1  Family history of Breast Cancer (V16 3)  Father    2  Family history of Adenocarcinoma Of Large Intestine (V16 0)  Sister    3  Family history of Gastric Cancer (V16 0)  Family History    4  Denied: Family history of Alcohol abuse   5  Family history of Cancer   6  Family history of malignant neoplasm (V16 9) (Z80 9)   7  Family history of stroke (V17 1) (Z82 3)   8  Denied: Family history of substance abuse   9  Family history of thyroid disease (V18 19) (Z83 49)   10  Denied: Family history of Mental health problem    Social History   · Denied: History of Always uses seat belt   · Marital History - Currently    · Never a smoker   · Never Drank Alcohol    Surgical History    1  History of Breast Surgery Mastectomy   2  History of Cataract Surgery   3  History of [place] Lymph Node Biopsy   4  History of Total Abdominal Hysterectomy With Removal Of Both Ovaries    Current Meds   1  [employer] Vitamin C 500 MG TABS; Therapy: (Recorded:70Cuk3978) to Recorded   2  Atorvastatin Calcium 40 MG Oral Tablet; TAKE ONE TABLET BY MOUTH ONCE DAILY;  Therapy: 92KEA0143 to (Rita Arndt)  Requested for: 63SLH2049; Last Rx:09Mar2017 Ordered   3  Azithromycin 200 MG/5ML Oral Suspension Reconstituted; 2 tsps for one dose then 1 tsp for 4 days; Therapy: 90WZH9221 to (Diomedes Singer)  Requested for: 20Oct2017 Ordered   4  Calcium 600 MG Oral Tablet; Doesn't take during the summer time; Therapy: 27ACT5767 to Recorded   5  DilTIAZem HCl - 120 MG Oral Tablet; Therapy: 13PIK3963 to  Requested for: 22AUR9895 Recorded   6  Eliquis 2 5 MG Oral Tablet; Take 1 tablet twice daily; Therapy: 55TDG5425 to (Jessica Watts)  Requested for: 43Wcu9746; Last Rx:68Fon9161 Ordered   7  Furosemide 20 MG Oral Tablet; Take 1 tablet daily; Therapy: 07Apr2017 to (Evaluate:02Apr2018)  Requested for: 90Zpi9816; Last Rx:07Apr2017 Ordered   8  Levothyroxine Sodium 125 MCG Oral Tablet; Take One tablet by mouth in the morning; Therapy: 55CUI3883 to (Diomedes Lara)  Requested for: 21Jun2017 Ordered   9  Metoprolol Succinate ER 25 MG Oral Tablet Extended Release 24 Hour; TAKE 0 5 TABLET Every twelve hours; Therapy: 07Apr2017 to (Evaluate:04Jun2018)  Requested for: 27MBM6031; Last Rx:06Nov2017 Ordered   10  Nystatin-Triamcinolone 072458-6 1 UNIT/GM-% External Cream; APPLY SPARINGLY TO  AFFECTED AREA(S) 3 TIMES A DAY; Therapy: 31Jzt4050 to (Rupesh Montana)  Requested for: 47Pke8299; Last  Rx:45Gxj8336 Ordered   11  Potassium Chloride ER 20 MEQ Oral Tablet Extended Release; Take 1 tablet daily; Therapy: 92Zgl2906 to (Evaluate:24Mar2018)  Requested for: 69RZX3160; Last  Rx:61Qbi4402 Ordered   12  Sertraline HCl - 25 MG Oral Tablet; take one tablet by mouth every day; Therapy: 08DLG6162 to (VDZDDSDN:33YKY5109)  Requested for: 31EFU6768; Last  Rx:80Fqn0056 Ordered   13  Vitamin D TABS; Therapy: ([phone]) to Recorded    The medication list was reviewed and updated today  Allergies  1  Corticosteroids   2  Levaquin TABS   3   Lyrica CAPS    Vitals   Recorded: 72VAW2840 10:43AM   Temperature 97 4 F, Tympanic   Heart Rate 96, L Radial   Pulse Quality Regular, L Radial   Systolic 259, LUE, Sitting   Diastolic 72, LUE, Sitting   Height 5 ft 5 in   Weight 129 lb 6 4 oz   BMI Calculated 21 53   BSA Calculated 1 64       Physical Exam   Constitutional  General appearance: No acute distress, well appearing and well nourished  Pulmonary  Respiratory effort: No increased work of breathing or signs of respiratory distress  Auscultation of lungs: Clear to auscultation  Cardiovascular  Auscultation of heart: Abnormal   The rhythm was irregularly irregular  Examination of extremities for edema and/or varicosities: Abnormal   bilateral pretibial 2+ pitting edema  Skin  Examination of the skin for lesions: Abnormal  -- RLE with open blister noted  No ulceration  no redness or tenderness  Clear drainage noted  Distal to that is 3 mm intact blister  Psychiatric  Orientation to person, place, and time: Normal    Mood and affect: Normal          Future Appointments    Date/Time Provider Specialty Site   01/22/2018 10:30 AM Tilton Kanner, M D  Hematology Oncology [facility]   05/03/2018 11:00 AM SANFORD Fernandez   Surgical Oncology [facility]   12/29/2017 03:00 PM Atiya Cruz  [facility] MD       Signatures   Electronically signed by : El Mathis ; Nov 9 2017 11:21AM EST                       (Author)    Electronically signed by : Nick Hyman DO; Nov 9 2017 11:48AM EST                       (Author)

## 2017-11-21 ENCOUNTER — GENERIC CONVERSION - ENCOUNTER (OUTPATIENT)
Dept: OTHER | Facility: OTHER | Age: 82
End: 2017-11-21

## 2017-11-21 ENCOUNTER — ALLSCRIPTS OFFICE VISIT (OUTPATIENT)
Dept: OTHER | Facility: OTHER | Age: 82
End: 2017-11-21

## 2017-12-29 ENCOUNTER — ALLSCRIPTS OFFICE VISIT (OUTPATIENT)
Dept: OTHER | Facility: OTHER | Age: 82
End: 2017-12-29

## 2017-12-30 NOTE — PROGRESS NOTES
Assessment   1  Age-related cognitive decline (294 9) (R41 81)   2  Breast cancer (174 9) (C50 919)   · right   3  Depression with anxiety (300 4) (F41 8)   4  Venous ulcer of right leg (454 0) (I83 019)   5  Chronic diastolic CHF (congestive heart failure) (428 32,428 0) (I50 32)   6  Lower leg edema (782 3) (R60 0)   7  PAF (paroxysmal atrial fibrillation) (427 31) (I48 0)    Plan   Hypothyroidism    · (1) T4, FREE; Status:Active; Requested for:73Tyf9979;    · (1) TSH; Status:Active; Requested for:45Lxb1951;   Venous ulcer of right leg    · *1 - [facility]  *  Status: Hold For - Scheduling  Requested    for: 54WGB3745  Care Summary provided  : Yes    Nathan Corrales is experiencing a culmination of medical problems including chronic CHF with edema to both thighs venous stasis ulcers are deepening since onset 2 months ago and require Wound Care management  fibrillation rate is variable and certainly contributes to hypotension and asthenia  to take both Furosemide 20 mg tablets once in the morning instead of 12 hr split  [facility] who have worked with her in the past  José Miguel Alex is homebound and family is steadfast in wanting to keep her out of the hospital  also donât want discussion about Hospice  see Dr Olga Hester for breast cancer followup  Request recheck TFT's when she gets bloodwork for him  Possible side effects of new medications were reviewed with the patient/guardian today  The treatment plan was reviewed with the patient/guardian  The patient/guardian understands and agrees with the treatment plan       Self Referrals: No      Chief Complaint   Patient is here today for follow up of chronic conditions described in HPI  History of Present Illness   3 month followup  with , daughter and granddaughter who is a nurse, difficulty getting RLE wound to heal  been eating better overall  Usually three meals with evening meal from [employer] recent falls        Review of Systems        Constitutional: feeling poorly,-- recent 6 lb weight gain-- and-- feeling tired  Eyes: no purulent discharge from the eyes  ENT: no nasal discharge  Cardiovascular: fast heart rate-- and-- lower extremity edema, but-- no chest pain-- and-- no palpitations  Respiratory: shortness of breath during exertion, but-- no cough  Gastrointestinal: no abdominal pain  Musculoskeletal: arthralgias-- and-- myalgias  Integumentary: skin wound  Neurological: dizziness-- and-- difficulty walking  Hematologic/Lymphatic: no tendency for easy bruising  Active Problems   1  Actinic keratosis (702 0) (L57 0)   2  Age-related cognitive decline (294 9) (R41 81)   3  Anxiety (300 00) (F41 9)   4  Bacterial pneumonia (482 9) (J15 9)   5  Benign paroxysmal positional vertigo (386 11) (H81 10)   6  Blister of leg (916 2) (S80 829A)   7  Breast cancer (174 9) (C50 919)   8  Chronic diastolic CHF (congestive heart failure) (428 32,428 0) (I50 32)   9  Compression fracture of thoracic vertebra, sequela (905 1) (S22 000S)   10  Depression with anxiety (300 4) (F41 8)   11  Dizziness (780 4) (R42)   12  Feeling weak (780 79) (R53 1)   13  Flu vaccine need (V04 81) (Z23)   14  Glaucoma (365 9) (H40 9)   15  Hyperglycemia (790 29) (R73 9)   16  Hyperlipidemia (272 4) (E78 5)   17  Hypertension (401 9) (I10)   18  Hypothyroidism (244 9) (E03 9)   19  Insomnia (780 52) (G47 00)   20  Lower leg edema (782 3) (R60 0)   21  PAF (paroxysmal atrial fibrillation) (427 31) (I48 0)   22  Postural dizziness with presyncope (780 4,780 2) (R42,R55)   23  Screening for genitourinary condition (V81 6) (Z13 89)   24  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)   25  Thoracic back pain (724 1) (M54 6)   26  Thoracic neuralgia (729 2) (M79 2)   27  Tinea pedis (110 4) (B35 3)   28  Tinea pedis of right foot (110 4) (B35 3)   29   Transient ischemic attack (TIA) (435  9) (G45 9)   30  Vitamin D deficiency (268 9) (E55 9)    Past Medical History   1  Denied: History of Alcohol abuse   2  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)   3  History of athlete's foot (V12 09) (Z86 19)   4  History of breast cancer (V10 3) (Z85 3)   5  History of pneumothorax (V12 69) (Z87 09)   6  Denied: History of substance abuse   7  Hypertension (401 9) (I10)   8  History of Tuberculosis (V12 01)   9  History of URTI (acute upper respiratory infection) (465 9) (J06 9)     The active problems and past medical history were reviewed and updated today  Surgical History   1  History of Breast Surgery Mastectomy   2  History of Cataract Surgery   3  History of [place] Lymph Node Biopsy   4  History of Total Abdominal Hysterectomy With Removal Of Both Ovaries     The surgical history was reviewed and updated today  Family History   Mother    1  Family history of Breast Cancer (V16 3)  Father    2  Family history of Adenocarcinoma Of Large Intestine (V16 0)  Sister    3  Family history of Gastric Cancer (V16 0)  Family History    4  Denied: Family history of Alcohol abuse   5  Family history of Cancer   6  Family history of malignant neoplasm (V16 9) (Z80 9)   7  Family history of stroke (V17 1) (Z82 3)   8  Denied: Family history of substance abuse   9  Family history of thyroid disease (V18 19) (Z83 49)   10  Denied: Family history of Mental health problem     The family history was reviewed and updated today  Social History    · Marital History - Currently    · Never a smoker   · Never Drank Alcohol  The social history was reviewed and updated today  The social history was reviewed and is unchanged  Current Meds    1  Alber[employer]ons Vitamin C 500 MG TABS; Therapy: (Recorded:20Tpp3907) to Recorded   2  Atorvastatin Calcium 40 MG Oral Tablet; TAKE ONE TABLET BY MOUTH ONCE DAILY;      Therapy: 04KGP2415 to (Mary Forrest)  Requested for: 40QSN6164; Last     Rx:09Mar2017 Ordered   3  Calcium 600 MG Oral Tablet; Doesn't take during the summer time; Therapy: 09PAU3029 to Recorded   4  DilTIAZem HCl - 120 MG Oral Tablet; Therapy: 64QCD9661 to  Requested for: 95ZPD1300 Recorded   5  Eliquis 2 5 MG Oral Tablet; Take 1 tablet twice daily; Therapy: 37FYR8161 to (Kathaleen Bernheim)  Requested for: 64Caf9380; Last     Rx:88Tzx9380 Ordered   6  Furosemide 20 MG Oral Tablet; TAKE 2 TABLETS ONCE DAILY; Therapy: 07Apr2017 to (Evaluate:27Jun2018)  Requested for: 16Psd5406 Recorded   7  Levothyroxine Sodium 125 MCG Oral Tablet; Take One tablet by mouth in the morning; Therapy: 54AIJ4476 to (Diomedes Chahal)  Requested for: 21Jun2017 Ordered   8  Metoprolol Succinate ER 25 MG Oral Tablet Extended Release 24 Hour; TAKE 0 5     TABLET Every twelve hours; Therapy: 11Opb5527 to (Evaluate:04Jun2018)  Requested for: 01FOR8688; Last     Rx:06Nov2017 Ordered   9  Nystatin-Triamcinolone 229894-9 1 UNIT/GM-% External Cream; APPLY SPARINGLY TO     AFFECTED AREA(S) 3 TIMES A DAY; Therapy: 53Jnt2748 to ([id number])  Requested for: 71Ldw0220; Last     Rx:55Osz6524 Ordered   10  Potassium Chloride ER 20 MEQ Oral Tablet Extended Release; Take 1 tablet daily; Therapy: 50Ofs5096 to (Evaluate:24Mar2018)  Requested for: 68RWM4701; Last      Rx:42Boe2793 Ordered   11  Sertraline HCl - 25 MG Oral Tablet; take one tablet by mouth every day; Therapy: 94DZP1737 to (QYUSBEBB:32HEB9075)  Requested for: 94SYG2644; Last      Rx:66Azj6280 Ordered   12  Vitamin D TABS; Therapy: ([phone]) to Recorded     The medication list was reviewed and updated today  Allergies   1  Corticosteroids   2  Levaquin TABS   3   Lyrica CAPS    Vitals   Vital Signs    Recorded: 31DCC4768 02:29PM   Temperature 97 1 F, Tympanic   Heart Rate 84, L Radial   Pulse Quality Regular, L Radial   Systolic 96, LUE, Sitting   Diastolic 50, LUE, Sitting   Height 5 ft 5 in Weight 142 lb 3 2 oz   BMI Calculated 23 66   BSA Calculated 1 71     Physical Exam        Constitutional      General appearance: Abnormal  -- frail  Pulmonary      Respiratory effort: No increased work of breathing or signs of respiratory distress  Auscultation of lungs: Abnormal  -- decreased breath sounds with bibasilar crackles  Cardiovascular      Auscultation of heart: Abnormal  -- fibrillation at 86/min  Examination of extremities for edema and/or varicosities: Abnormal  -- 2-3 + edema to top of thighs  Musculoskeletal      Gait and station: Abnormal  -- slow gait  Skin      Examination of the skin for lesions: Abnormal  -- wound RLE anteriorly without evidence of margin healing  Psychiatric      Orientation to person, place, and time: Normal        Mood and affect: Abnormal  -- appears depressed  Future Appointments      Date/Time Provider Specialty Site   01/22/2018 10:20 AM SANFORD Mcfarland  Hematology Oncology [facility]   05/03/2018 11:00 AM SANFORD Olvera   Surgical Oncology [place] [address] [facility]   04/02/2018 02:00 PM Jessika Shultz MD Family Medicine Kiana Rajput MD     Signatures    Electronically signed by : Florencio Martin MD; Dec 29 2017  3:41PM EST                       (Author)

## 2018-01-09 NOTE — RESULT NOTES
Message   Recorded as Task   Date: 03/01/2017 01:55 PM, Created By: Price Hoover   Task Name: Care Coordination   Assigned To: [facility] [place] [facility]   Regarding Patient: Caroline Rivera, Status: In Progress   Comment:    Yunier Duarte - 01 Mar 2017 1:55 PM     TASK CREATED  Caller: Self; Care Coordination; [phone] (Home)  S/w pt and her   States no bm x 2 days  pt c/o pain in stomach, "it feels swollen"  Pt states she is also drinking a lot - 10 glasses of water yesterday w/ minimal urination  Pt denies le edema, states she feels sob  Pt and  confirmed miralax and senakot as directed at 2/27 ov w/ DG with no relief  Questioning how to proceed  Advised pt, will d/w DG and cb to advise  In the meantime; warm uncaffinated drinks; warm juice or water, walk, warm bath, prune juice / prunes may help  Pt and her  verbalized understanding  will await a cb from this office  Ronny Glynn - 01 Mar 2017 3:20 PM     TASK REPLIED TO: Previously Assigned To [facility] [facility]  Is she taking the Ultracet? Yunier Duarte - 01 Mar 2017 3:47 PM     TASK EDITED  s/w pt and   confirmed ultracet at 0600 today  Advised pt and  - stop ultracet x 2 days per dg  Continue miralax at night and sennakot during the day  Advised pt to cb if no bm in 2 days  Pt and  verbalized understanding  Stated they may have their daughter, Rudy Harper, call to go over the above info again as she organizes the pt's medication  Confirmed w/ pt, ok to s/w Rudy Harper  Advised pt, she will need to complete a release of info to keep on file  at her next ov  Pt verbalized understanding and appreciation  ** FU on Friday  Yunier Duarte - 01 Mar 2017 4:02 PM     TASK EDITED  ** FYI ***  s/w pt's daughter, Makeda Ding, per pt's request  Per Makeda Ding, pt has not had a bm since Friday  C/o pain sitting, walking, lying   States she doesn't want to eat because she's nauseous, expects the pt is probably dehydrated  Advised Jill per DG, pt should be evaluated for poss impaction  Malinda Marcos questioned pcp vs er as Malinda Marcos is 3 hrs away  Beatriz Patrick, if the pt can be seen by pcp tonight or tomorrow am - that would be fine  However, the pt may be directed to the ed / hospital by pcp  Malinda Marcos verbalized understanding  states she will s/w her parents and cb w/ an update tomorrow  Per Malinda Marcos, release of info was completed prior to ov, listing 4 people including herself, her father, and her daughter  Beatriz Patrick, will fu and advise of any issues  Malinda Marcos verbalized understanding and appreciation  Ronny Glynn - 01 Mar 2017 4:24 PM     TASK REPLIED TO: Previously Assigned To Ronny Glynn  Provider aware  Thank you          Signatures   Electronically signed by : Carrie Wahl, ; Mar  2 2017  2:11PM EST                       (Author)

## 2018-01-10 NOTE — MISCELLANEOUS
Message   Recorded as Task   Date: 03/02/2017 02:18 PM, Created By: Jerome Astudillo   Task Name: Follow Up   Assigned To: [facility] [place] [facility]   Regarding Patient: Ravindra Massey, Status: Active   Comment:    Yunier Duarte - 02 Mar 2017 2:18 PM     TASK CREATED  s/w pt's , Anai Caicedo, per release of info on file  States pt was admitted to [facility] last night for constipation  States pt is feeling much better now  Salome Doss, will make DG aware  Anai Caicedo verbalized understanding and appreciaton for the call  Ronny Glynn - 85 Mar 2017 2:19 PM     TASK REPLIED TO: Previously Assigned To [facility]  Provider aware  Thank you for following up  Active Problems    1  Actinic keratosis (702 0) (L57 0)   2  Age-related cognitive decline (294 9) (R41 81)   3  Anxiety (300 00) (F41 9)   4  Benign paroxysmal positional vertigo (386 11) (H81 10)   5  Breast cancer (174 9) (C50 919)   6  Compression fracture of thoracic vertebra, sequela (905 1) (S22 000S)   7  Dizziness (780 4) (R42)   8  Feeling weak (780 79) (R53 1)   9  Glaucoma (365 9) (H40 9)   10  Hyperglycemia (790 29) (R73 9)   11  Hyperlipidemia (272 4) (E78 5)   12  Hypertension (401 9) (I10)   13  Hypothyroidism (244 9) (E03 9)   14  Inflamed seborrheic keratosis (702 11) (L82 0)   15  Insomnia (780 52) (G47 00)   16  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)   17  Thoracic back pain (724 1) (M54 6)   18  Tinea pedis (110 4) (B35 3)   19  Transient ischemic attack (TIA) (435 9) (G45 9)   20  Vitamin D deficiency (268 9) (E55 9)    Current Meds   1  [employer]ons Vitamin C 500 MG TABS; Therapy: (Recorded:55Hmg9171) to Recorded   2  Aspir-81 TBEC; Therapy: (Recorded:44Gfb1572) to Recorded   3  Aspirin EC Low Dose 81 MG Oral Tablet Delayed Release; TAKE 2 TABLET DAILY; Therapy: ([phone]) to Recorded   4   Atorvastatin Calcium 40 MG Oral Tablet; TAKE 1 TABLET BY MOUTH DAILY; Therapy: 73XJG6297 to (Evaluate:03Mar2017)  Requested for: 65ILI4733; Last   Rx:08Mar2016 Ordered   5  Caltrate 600 TABS; twice daily; Therapy: () to Recorded   6  Combigan 0 2-0 5 % Ophthalmic Solution; Therapy: (Recorded:27Feb2017) to Recorded   7  Cyclobenzaprine HCl - 5 MG Oral Tablet; 1-2 tabs PO q 8 hrs prn muscle spasm; Therapy: 85WWO4369 to (Evaluate:26Mar2017)  Requested for: 01HZK9548; Last   Rx:24Feb2017 Ordered   8  Diclofenac Sodium 50 MG Oral Tablet Delayed Release; take 1 tablet twice daily after   meals; Therapy: 06OZB3470 to (Evaluate:05Apr2017); Last Rx:04Feb2017 Ordered   9  HydroCHLOROthiazide 12 5 MG Oral Capsule; take 1 capsule by mouth once daily; Therapy: 29UJF1358 to (Evaluate:10Feb2017)  Requested for: 02HGW4762; Last   Rx:16Feb2016 Ordered   10  Labetalol HCl - 100 MG Oral Tablet; TAKE 1 TABLET EVERY EVENING; Therapy: 17YDM1563 to (Gerldhugo Amin)  Requested for: 82LIQ0961; Last    Rx:08Mar2016 Ordered   11  Lansoprazole 30 MG TBDP; Therapy: (Recorded:62Mzh2307) to Recorded   12  Levothyroxine Sodium 100 MCG Oral Tablet; Take 1 tablet daily; Therapy: 34MDF8890 to (Evaluate:30Jan2018)  Requested for: 39SDH3352; Last    Rx:04Feb2017 Ordered   13  Polyethylene Glycol 3350 Oral Powder (MiraLax); Take every other night and if no BM    longer than 2 days every night until having regular BM's;    Therapy: 77AJM8804 to (Evaluate:29Mar2017); Last Rx:26Yfs0919 Ordered   14  Senokot 8 6 MG Oral Tablet; Take 2 Daily for constipation; Therapy: 61MSZ4012 to (Evaluate:29Mar2017); Last Rx:81Naa6954 Ordered   15  Tramadol-Acetaminophen 37 5-325 MG Oral Tablet; TAKE 1 TABLET EVERY 8 HOURS    AS NEEDED; Therapy: 65DUB2903 to (Evaluate:28Apr2017); Last Rx:27Feb2017 Ordered   16  Valsartan 160 MG Oral Tablet; TAKE 1/2 TABLET DAILY; Therapy: 96QQU4645 to (Carlos Amin)  Requested for: 36DNH9329; Last    Rx:08Mar2016 Ordered   17   Vitamin D TABS; Therapy: ([phone]) to Recorded   18  Xalatan 0 005 % Ophthalmic Solution (Latanoprost); Therapy: 00BYS4100 to (Last Rx:29Nwk6102)  Requested for: 31Onx5394 Ordered    Allergies    1  Corticosteroids   2   Levaquin TABS    Signatures   Electronically signed by : Viv Bush, ; Mar  2 2017  2:22PM EST                       (Author)

## 2018-01-11 NOTE — MISCELLANEOUS
Message   Recorded as Task   Date: 09/06/2017 05:07 PM, Created By: Pete Fam   Task Name: Medical Complaint Callback   Assigned To: [address] [place] [address]   Regarding Patient: Zulema Dorsey, Status: Active   Comment:    CorinneMeg - 06 Sep 2017 5:07 PM     TASK CREATED  Medical Complaint  Daughter Mayda Webb called stating Juventino Russo had another episode of her dizziness but this time is took her longer to recover from it then usual   States she did start to fall but her Danita's  caught her  She has no injuries except a scratch from her husbands nail  Neyda Vela is set against going to ER  States she is also scared that she is going to fall again  Asked Mayda Webb if Juventino Russo uses her walker and she states she will only use it in the moring and at night but not during the day  I suggested she have Juventino Russo use it all day to help prevent falls  She also stated Juventino Russo has lost about 5 lbs in the last month and is concerned about that  I told her I didnt feel she needed ER evaluation since she is currently symptom free and suffered no injuries  She would like to know if you feel her Sept  25 appt should be moved sooner  Asking if she should be seen on Friday  [phone] or [phone]   Nick Quintero - 07 Sep 2017 7:51 AM     TASK REPLIED TO: Previously Assigned To [address] [place] [address]  Can she come in at 11:00 on Friday? Yodit Henry - 07 Sep 2017 7:57 AM     TASK REPLIED TO: Previously Assigned To [facility] aware        Active Problems    1  Actinic keratosis (702 0) (L57 0)   2  Age-related cognitive decline (294 9) (R41 81)   3  Anxiety (300 00) (F41 9)   4  Benign paroxysmal positional vertigo (386 11) (H81 10)   5  Breast cancer (174 9) (C50 919)   6  Chronic diastolic CHF (congestive heart failure) (428 32,428 0) (I50 32)   7  Compression fracture of thoracic vertebra, sequela (905 1) (S22 000S)   8  Dizziness (780 4) (R42)   9   Feeling weak (780 79) (R53 1)   10  Glaucoma (365 9) (H40 9)   11  Hyperglycemia (790 29) (R73 9)   12  Hyperlipidemia (272 4) (E78 5)   13  Hypertension (401 9) (I10)   14  Hypothyroidism (244 9) (E03 9)   15  Insomnia (780 52) (G47 00)   16  Lower leg edema (782 3) (R60 0)   17  PAF (paroxysmal atrial fibrillation) (427 31) (I48 0)   18  Screening for genitourinary condition (V81 6) (Z13 89)   19  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)   20  Thoracic back pain (724 1) (M54 6)   21  Thoracic neuralgia (729 2) (M79 2)   22  Tinea pedis (110 4) (B35 3)   23  Tinea pedis of right foot (110 4) (B35 3)   24  Transient ischemic attack (TIA) (435 9) (G45 9)   25  Vitamin D deficiency (268 9) (E55 9)    Current Meds   1  [employer]ons Vitamin C 500 MG TABS; Therapy: (Recorded:00Gjn3181) to Recorded   2  Atorvastatin Calcium 40 MG Oral Tablet; TAKE ONE TABLET BY MOUTH ONCE DAILY; Therapy: 22QUN6899 to (Frank Justice)  Requested for: 63RIN4765; Last   Rx:09Mar2017 Ordered   3  Calcium 600 MG Oral Tablet; Doesn't take during the summer time; Therapy: 40UZT8437 to Recorded   4  Cartia  MG Oral Capsule Extended Release 24 Hour; TAKE 1 CAPSULE Daily; Therapy: 74VAL2593 to (Evaluate:79Cyy8055)  Requested for: 07DEB0566; Last   Rx:84Unp9494 Ordered   5  Eliquis 2 5 MG Oral Tablet; Take 1 tablet twice daily; Therapy: 36QJG0485 to (Stanislav Villatoro)  Requested for: 82Pfs3528; Last   Rx:56Qka7519 Ordered   6  Furosemide 20 MG Oral Tablet; Take 1 tablet daily; Therapy: 07Apr2017 to (Evaluate:46Vnt5042)  Requested for: 31Ije1434; Last   Rx:07Apr2017 Ordered   7  Levothyroxine Sodium 125 MCG Oral Tablet; Take One tablet by mouth in the morning; Therapy: 34NKS4551 to (Last Ralene Landing)  Requested for: 21Jun2017 Ordered   8  Metoprolol Succinate ER 25 MG Oral Tablet Extended Release 24 Hour; take 1/2 tablet   daily;    Therapy: 07Apr2017 to [phone])  Requested for: 07Apr2017; Last   Rx:07Apr2017 Ordered   9  Nystatin-Triamcinolone 248811-4 1 UNIT/GM-% External Cream; APPLY SPARINGLY TO   AFFECTED AREA(S) 3 TIMES A DAY; Therapy: 89Qpv7203 to (Alveda Linnette)  Requested for: 70Nrq1414; Last   Rx:95Icb2671 Ordered   10  Potassium Chloride ER 20 MEQ Oral Tablet Extended Release; Take 1 tablet daily; Therapy: 06TFL0886 to (Last Rx:07Apr2017)  Requested for: 07Apr2017 Ordered   11  Vitamin D TABS; Therapy: (Recorded:80Wiw8036) to Recorded    Allergies    1  Corticosteroids   2  Levaquin TABS   3   Lyrica CAPS    Signatures   Electronically signed by : Jeimy Wells MD; Sep  7 2017  8:51AM EST                       (Co-author)

## 2018-01-11 NOTE — PROGRESS NOTES
Assessment    1  Encounter for preventive health examination (V70 0) (Z00 00)   2  Compression fracture of thoracic vertebra, sequela (905 1) (S22 000S)   3  Dizziness (780 4) (R42)   4  Age-related cognitive decline (294 9) (R41 81)   5  Breast cancer (174 9) (C50 919)   · right   6  Hypothyroidism (244 9) (E03 9)   7  Chronic diastolic CHF (congestive heart failure) (428 32,428 0) (I50 32)   8  PAF (paroxysmal atrial fibrillation) (427 31) (I48 0)    Plan  Hypothyroidism    · Levothyroxine Sodium 125 MCG Oral Tablet; Take One tablet by mouth in the  morning   · Follow-up visit in 3 months Evaluation and Treatment  Follow-up  Status: Hold For -  Scheduling  Requested for: 21Jun2017   · (1) T4, FREE; Status:Hold For - Exact Date; Requested for:Approx 38Mfh5225;    · (1) TSH; Status:Hold For - Exact Date; Requested for:Approx [id number];     Discussion/Summary    Compression fracture and mobility improving  BP excellent   Labs reviewed in detail and copy given  Thyroid inadequately replaced  Increase to 125 mcg daily  Edema is problematic, wearing compression stockings  Return to Dr Rosemary Marroquin for further management      Impression: Subsequent Annual Wellness Visit, with preventive exam as well as age and risk appropriate counseling completed  Cardiovascular screening and counseling: screening is current  Diabetes screening and counseling: screening is current  Colorectal cancer screening and counseling: screening not indicated  Breast cancer screening and counseling: screening is current  Cervical cancer screening and counseling: screening not indicated  Osteoporosis screening and counseling: the risks and benefits of screening were discussed and the patient declines screening  Abdominal aortic aneurysm screening and counseling: screening not indicated  HIV screening and counseling: screening not indicated   Immunizations: influenza vaccine is up to date this year, the lifetime pneumococcal vaccine has been completed, hepatitis B vaccination series is not indicated at this time due to the patient's low risk of josé the disease, the patient declines the Zostavax vaccine and the patient declines the Td vaccine  Advance Directive Planning: complete and up to date  Advice and education were given regarding fall risk reduction  She was referred to none  Medical Equipment/Suppliers: none  Patient Discussion: plan discussed with the patient, follow-up visit needed in 6 months  Possible side effects of new medications were reviewed with the patient/guardian today  The treatment plan was reviewed with the patient/guardian  The patient/guardian understands and agrees with the treatment plan         Self Referrals: No      Chief Complaint  HM      Advance Directives  Advance Directive [facility]:   YES - Patient has an advance health care directive  The patient has a living will located  in patient's home  Capacity/Competence: This patient has limited decision making capacity for discussion of advance care planning and This patient has limited decision making competency for discussion of advance care planning  History of Present Illness  HPI: Had dizziness on Lyrica and stopped it  Having ongoing swelling of the legs  Welcome to [employer] and Wellness Visits: The patient is being seen for the subsequent annual wellness visit  Medicare Screening and Risk Factors   Hospitalizations: she has been previously hospitalizied and she has been hospitalized 2 times  Once per lifetime medicare screening tests: ECG ~U() and AAA screening US has not yet been done  Medicare Screening Tests Risk Questions   Abdominal aortic aneurysm risk assessment: none indicated  Osteoporosis risk assessment: , female gender and over 48years of age  HIV risk assessment: none indicated  Drug and Alcohol Use: The patient has never smoked cigarettes  The patient reports never drinking alcohol   She has never used illicit drugs  Diet and Physical Activity: Current diet includes well balanced meals  She exercises infrequently  Mood Disorder and Cognitive Impairment Screening:   Depression screening score was   negative for symptoms  She denies feeling down, depressed, or hopeless over the past two weeks  She denies feeling little interest or pleasure in doing things over the past two weeks  Cognitive impairment screening: denies difficulty learning/retaining new information, denies difficulty handling complex tasks, denies difficulty with reasoning, denies difficulty with spatial ability and orientation, denies difficulty with language and denies difficulty with behavior  Functional Ability/Level of Safety: Hearing is normal bilaterally and a hearing aid is not used  The patient is currently unable to drive, but able to do activities of daily living without limitations, able to do instrumental activities of daily living without limitations and able to participate in social activities without limitations  Activities of daily living details: transportation help needed, but does not need help using the phone, does not need help shopping, no meal preparation help needed, does not need help doing housework, does not need help doing laundry, does not need help managing medications and does not need help managing money  Fall risk factors:  no mobility impairment, no antidepressant use, no sedative use, no visual impairment, no urinary incontinence and no cognitive impairment  Home safety risk factors:  no unfamiliar surroundings, no loose rugs and no poor household lighting  Advance Directives: Advance directives: living will, durable power of  for health care directives and advance directives  end of life decisions were not reviewed with the patient     Co-Managers and Medical Equipment/Suppliers: See Patient Care Team       Reviewed Updated Lindsey Bueno:   Last Medicare Wellness Visit Information was reviewed, patient interviewed and updates made to the record today  Patient Care Team    Care Team Member Role Specialty Office Number   VIRGILIO Lambert MD Specialist Hematology Oncology [phone]   Ilene Davis MD Specialist Surgical Oncology [phone]   [address] [place] [phone]   Aracelis Graham [address] [facility] [phone]     Ashish Uribe 112  Certified Clinical Nurse Specialist [phone]     Review of Systems    Constitutional: no chills and no malaise  ENT: no earache and no nasal discharge  Cardiovascular: lower extremity edema, but no chest pain and no palpitations  Respiratory: no shortness of breath  Gastrointestinal: no abdominal pain  Genitourinary: no dysuria  Musculoskeletal: no diffuse joint pain  Integumentary and Breasts: no rashes  Neurological: dizziness, but no headache  Psychiatric: no insomnia  Hematologic and Lymphatic: no tendency for easy bruising  Active Problems    1  Actinic keratosis (702 0) (L57 0)   2  Age-related cognitive decline (294 9) (R41 81)   3  Anxiety (300 00) (F41 9)   4  Benign paroxysmal positional vertigo (386 11) (H81 10)   5  Breast cancer (174 9) (C50 919)   6  Chronic diastolic CHF (congestive heart failure) (428 32,428 0) (I50 32)   7  Compression fracture of thoracic vertebra, sequela (905 1) (S22 000S)   8  Dizziness (780 4) (R42)   9  Feeling weak (780 79) (R53 1)   10  Glaucoma (365 9) (H40 9)   11  Hyperglycemia (790 29) (R73 9)   12  Hyperlipidemia (272 4) (E78 5)   13  Hypertension (401 9) (I10)   14  Hypothyroidism (244 9) (E03 9)   15  Insomnia (780 52) (G47 00)   16  Lower leg edema (782 3) (R60 0)   17  PAF (paroxysmal atrial fibrillation) (427 31) (I48 0)   18  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)   19  Thoracic back pain (724 1) (M54 6)   20  Thoracic neuralgia (729 2) (M79 2)   21  Tinea pedis (110 4) (B35 3)   22   Transient ischemic attack (TIA) (435 9) (G45 9)   23  Vitamin D deficiency (268 9) (E55 9)    Past Medical History    · Denied: History of Alcohol abuse   · 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)   · History of athlete's foot (V12 09) (Z86 19)   · History of breast cancer (V10 3) (Z85 3)   · History of pneumothorax (V12 69) (Z87 09)   · Denied: History of substance abuse   · Hypertension (401 9) (I10)   · History of Tuberculosis (V12 01)   · History of URTI (acute upper respiratory infection) (465 9) (J06 9)    The active problems and past medical history were reviewed and updated today  Surgical History    · History of Breast Surgery Mastectomy   · History of Cataract Surgery   · History of [place] Lymph Node Biopsy   · History of Total Abdominal Hysterectomy With Removal Of Both Ovaries    The surgical history was reviewed and updated today  Family History  Mother    · Family history of Breast Cancer (V16 3)  Father    · Family history of Adenocarcinoma Of Large Intestine (V16 0)  Sister    · Family history of Gastric Cancer (V16 0)  Family History    · Denied: Family history of Alcohol abuse   · Family history of Cancer   · Family history of malignant neoplasm (V16 9) (Z80 9)   · Family history of stroke (V17 1) (Z82 3)   · Denied: Family history of substance abuse   · Family history of thyroid disease (V18 19) (Z83 49)   · Denied: Family history of Mental health problem    The family history was reviewed and updated today  Social History    · Denied: History of Always uses seat belt   · Marital History - Currently    · Never Drank Alcohol   · Never smoker  The social history was reviewed and updated today  The social history was reviewed and is unchanged  Current Meds   1  [employer] Vitamin C 500 MG TABS; Therapy: (Recorded:85Pjm8033) to Recorded   2  Aspirin EC Low Dose 81 MG Oral Tablet Delayed Release; TAKE 2 TABLET DAILY; Therapy: (Recorded:44Qtv9568) to Recorded   3  Atorvastatin Calcium 40 MG Oral Tablet; TAKE ONE TABLET BY MOUTH ONCE DAILY; Therapy: 96NES3790 to (Gutierrez Delgado)  Requested for: 44GKL8061; Last   Rx:09Mar2017 Ordered   4  Calcium 600 MG Oral Tablet; Therapy: 44VWG3992 to Recorded   5  Cartia  MG Oral Capsule Extended Release 24 Hour; Therapy: 04GBI5480 to Recorded   6  Furosemide 20 MG Oral Tablet; Take 1 tablet daily; Therapy: 07Apr2017 to (Evaluate:02Apr2018)  Requested for: 07Apr2017; Last   Rx:07Apr2017 Ordered   7  Levothyroxine Sodium 100 MCG Oral Tablet; Take 1 tablet daily; Therapy: 36WML7278 to (Evaluate:30Jan2018)  Requested for: 05ZMV3823; Last   Rx:43Upj1114 Ordered   8  Metoprolol Succinate ER 25 MG Oral Tablet Extended Release 24 Hour; take 1/2 tablet   daily; Therapy: 07Apr2017 to (Ivis Rodrigues)  Requested for: 07Apr2017; Last   Rx:07Apr2017 Ordered   9  Polyethylene Glycol 3350 Oral Powder; Take every other night and if no BM longer than 2   days every night until having regular BM's;   Therapy: 89IXX2747 to (Evaluate:29Mar2017); Last Rx:89Fhl5173 Ordered   10  Potassium Chloride ER 20 MEQ Oral Tablet Extended Release; Take 1 tablet daily; Therapy: 87AVF9264 to (Last Rx:07Apr2017)  Requested for: 07Apr2017 Ordered   11  Vitamin D TABS; Therapy: (653 640 056) to Recorded    The medication list was reviewed and updated today  Allergies    1  Corticosteroids   2  Levaquin TABS   3  Lyrica CAPS    Immunizations   ** Printed in Appendix #1 below  Vitals  Signs    Temperature: 98 3 F, Tympanic  Heart Rate: 60, L Radial  Pulse Quality: Regular, L Radial  Systolic: 959, LUE, Sitting  Diastolic: 80, LUE, Sitting  Height: 5 ft 5 in  Weight: 135 lb 6 4 oz  BMI Calculated: 22 53  BSA Calculated: 1 68    Physical Exam    Constitutional   General appearance: No acute distress, well appearing and well nourished  Eyes   Conjunctiva and lids: No swelling, erythema or discharge      Ears, Nose, Mouth, and Throat External inspection of ears and nose: Normal     Neck   Neck: Supple, symmetric, trachea midline, no masses  Thyroid: Normal, no thyromegaly  Pulmonary   Respiratory effort: No increased work of breathing or signs of respiratory distress  Auscultation of lungs: Clear to auscultation  Cardiovascular   Auscultation of heart: Normal rate and rhythm, normal S1 and S2, no murmurs  Carotid pulses: 2+ bilaterally  Peripheral vascular exam: Normal     Examination of extremities for edema and/or varicosities: Normal     Lymphatic   Palpation of lymph nodes in neck: No lymphadenopathy  Musculoskeletal   Gait and station: Normal     Psychiatric   Judgment and insight: Normal     Orientation to person, place, and time: Normal     Recent and remote memory: Intact  Mood and affect: Normal        Procedure    Procedure:   Results: 20/40 in the right eye with corrective device, 20/100 in the left eye with corrective device      Future Appointments    Date/Time Provider Specialty Site   2018 10:30 AM SANFORD Guerra  Hematology Oncology [facility]   07/10/2017 01:00 PM SANFORD Reyna  Cardiology [facility]   2018 11:00 AM SANFORD Rider   Surgical Oncology [place] [address] [facility]   2017 02:40 PM Amy Harman  [facility] MD     Signatures   Electronically signed by : Deysi Saunders MD; 2017 10:59AM EST                       (Author)    Appendix #1     Patient: Crispin Guevara ; : 1927; MRN: [id number]      1 2 3 4 5    Influenza  16-Nov-2012 09-Oct-2013 28-Oct-2014 19-Oct-2015 20-Dec-2016    PCV  19-Oct-2015        PPSV  Oct 1999 Sep 2009       Zoster  Temporarily Deferred: Pt requests deferral

## 2018-01-12 VITALS
SYSTOLIC BLOOD PRESSURE: 126 MMHG | DIASTOLIC BLOOD PRESSURE: 80 MMHG | WEIGHT: 126.8 LBS | HEART RATE: 56 BPM | TEMPERATURE: 97.2 F | BODY MASS INDEX: 21.13 KG/M2 | HEIGHT: 65 IN

## 2018-01-12 VITALS
HEIGHT: 65 IN | SYSTOLIC BLOOD PRESSURE: 108 MMHG | HEART RATE: 60 BPM | TEMPERATURE: 98.3 F | DIASTOLIC BLOOD PRESSURE: 80 MMHG | BODY MASS INDEX: 22.56 KG/M2 | WEIGHT: 135.4 LBS

## 2018-01-12 VITALS
WEIGHT: 182 LBS | HEART RATE: 70 BPM | HEIGHT: 65 IN | BODY MASS INDEX: 30.32 KG/M2 | DIASTOLIC BLOOD PRESSURE: 80 MMHG | RESPIRATION RATE: 16 BRPM | SYSTOLIC BLOOD PRESSURE: 122 MMHG

## 2018-01-13 VITALS
SYSTOLIC BLOOD PRESSURE: 126 MMHG | HEART RATE: 74 BPM | BODY MASS INDEX: 24.45 KG/M2 | HEIGHT: 63 IN | DIASTOLIC BLOOD PRESSURE: 80 MMHG | TEMPERATURE: 98.1 F | WEIGHT: 138 LBS

## 2018-01-13 VITALS
TEMPERATURE: 98.6 F | HEIGHT: 65 IN | DIASTOLIC BLOOD PRESSURE: 82 MMHG | BODY MASS INDEX: 22.46 KG/M2 | SYSTOLIC BLOOD PRESSURE: 110 MMHG | WEIGHT: 134.8 LBS | HEART RATE: 72 BPM

## 2018-01-13 VITALS
WEIGHT: 138.19 LBS | HEIGHT: 63 IN | TEMPERATURE: 98.4 F | DIASTOLIC BLOOD PRESSURE: 78 MMHG | BODY MASS INDEX: 24.48 KG/M2 | HEART RATE: 92 BPM | SYSTOLIC BLOOD PRESSURE: 118 MMHG

## 2018-01-13 VITALS
BODY MASS INDEX: 31.98 KG/M2 | HEART RATE: 68 BPM | WEIGHT: 138.2 LBS | TEMPERATURE: 98.6 F | DIASTOLIC BLOOD PRESSURE: 84 MMHG | HEIGHT: 55 IN | SYSTOLIC BLOOD PRESSURE: 122 MMHG

## 2018-01-13 VITALS
HEIGHT: 65 IN | TEMPERATURE: 97.4 F | BODY MASS INDEX: 21.56 KG/M2 | HEART RATE: 96 BPM | DIASTOLIC BLOOD PRESSURE: 72 MMHG | WEIGHT: 129.4 LBS | SYSTOLIC BLOOD PRESSURE: 122 MMHG

## 2018-01-13 VITALS
HEART RATE: 64 BPM | BODY MASS INDEX: 24.8 KG/M2 | SYSTOLIC BLOOD PRESSURE: 110 MMHG | WEIGHT: 140 LBS | HEIGHT: 63 IN | DIASTOLIC BLOOD PRESSURE: 70 MMHG

## 2018-01-13 VITALS
OXYGEN SATURATION: 98 % | HEIGHT: 63 IN | WEIGHT: 139.5 LBS | DIASTOLIC BLOOD PRESSURE: 84 MMHG | TEMPERATURE: 98.5 F | HEART RATE: 70 BPM | SYSTOLIC BLOOD PRESSURE: 132 MMHG | RESPIRATION RATE: 16 BRPM | BODY MASS INDEX: 24.72 KG/M2

## 2018-01-13 VITALS
HEIGHT: 65 IN | WEIGHT: 119 LBS | DIASTOLIC BLOOD PRESSURE: 60 MMHG | BODY MASS INDEX: 19.83 KG/M2 | SYSTOLIC BLOOD PRESSURE: 120 MMHG | HEART RATE: 119 BPM

## 2018-01-13 NOTE — MISCELLANEOUS
Message   Recorded as Task   Date: 10/20/2017 04:08 PM, Created By: Sumit Cabrera   Task Name: Medical Complaint Callback   Assigned To: [facility]   Regarding Patient: Danay Walker, Status: Active   Comment:    Meg Sun - 20 Oct 2017 4:08 PM     TASK CREATED  Medical Complaint  Nilda Gonzales called stating Logan Soni is having flu like symptoms  Symptoms started 4 days ago  Has vomitted because of mucus  Mucus is clear  Sinus congestion  No cough  Fever (didnt check with thermometer but feels warm)  Fatigue  Her son that lives there was recently sick and was put on abx  Nilda Gonzales is concerned that if she is not started on something it will lead to pneumonia  States Logan Soni is having some difficulty swollowing pills  Has a lot of mucus in throat and will sometimes vomit pills back up  Wondering if you do order med if liquid could be ordered? [facility] [phone]   Nick Quintero - 20 Oct 2017 4:38 PM     TASK REPLIED TO: Previously Assigned To [facility]  Rx Azithromycin done   Yodit Henry - 20 Oct 2017 4:41 PM     TASK EDITED  Nilda Gonzales aware  Active Problems    1  Actinic keratosis (702 0) (L57 0)   2  Age-related cognitive decline (294 9) (R41 81)   3  Anxiety (300 00) (F41 9)   4  Bacterial pneumonia (482 9) (J15 9)   5  Benign paroxysmal positional vertigo (386 11) (H81 10)   6  Breast cancer (174 9) (C50 919)   7  Chronic diastolic CHF (congestive heart failure) (428 32,428 0) (I50 32)   8  Compression fracture of thoracic vertebra, sequela (905 1) (S22 000S)   9  Depression with anxiety (300 4) (F41 8)   10  Dizziness (780 4) (R42)   11  Feeling weak (780 79) (R53 1)   12  Flu vaccine need (V04 81) (Z23)   13  Glaucoma (365 9) (H40 9)   14  Hyperglycemia (790 29) (R73 9)   15  Hyperlipidemia (272 4) (E78 5)   16  Hypertension (401 9) (I10)   17  Hypothyroidism (244 9) (E03 9)   18  Insomnia (780 52) (G47 00)   19  Lower leg edema (782 3) (R60 0)   20   PAF (paroxysmal atrial fibrillation) (427 31) (I48 0)   21  Postural dizziness with presyncope (780 4,780 2) (R42,R55)   22  Screening for genitourinary condition (V81 6) (Z13 89)   23  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)   24  Thoracic back pain (724 1) (M54 6)   25  Thoracic neuralgia (729 2) (M79 2)   26  Tinea pedis (110 4) (B35 3)   27  Tinea pedis of right foot (110 4) (B35 3)   28  Transient ischemic attack (TIA) (435 9) (G45 9)   29  Vitamin D deficiency (268 9) (E55 9)    Current Meds   1  [employer] Vitamin C 500 MG TABS; Therapy: (Recorded:07Jjp6233) to Recorded   2  Atorvastatin Calcium 40 MG Oral Tablet; TAKE ONE TABLET BY MOUTH ONCE DAILY; Therapy: 37GTM4545 to (Lelan Peed)  Requested for: 49NAS8291; Last   Rx:09Mar2017 Ordered   3  Azithromycin 200 MG/5ML Oral Suspension Reconstituted; 2 tsps for one dose then 1 tsp   for 4 days; Therapy: 19ZCW3903 to (Last Noberto Dress)  Requested for: 70OKX7110; Status: ACTIVE   - Transmit to Pharmacy - Awaiting Verification Ordered   4  Calcium 600 MG Oral Tablet; Doesn't take during the summer time; Therapy: 68LHL4733 to Recorded   5  Cartia  MG Oral Capsule Extended Release 24 Hour; TAKE 1 CAPSULE Daily; Therapy: 66IZT7670 to (Evaluate:67Hld0656)  Requested for: 83MZG8196; Last   Rx:91Vwz1120 Ordered   6  DilTIAZem HCl - 60 MG Oral Tablet; Take one at bedtime; Therapy: 12Cdu0057 to (Last Rx:09Bao4302)  Requested for: 63Rsb0423; Status:   ACTIVE - Transmit to Pharmacy - Awaiting Verification Ordered   7  Eliquis 2 5 MG Oral Tablet; Take 1 tablet twice daily; Therapy: 53KCJ2971 to (Lelan Peed)  Requested for: 86Aiy5251; Last   Rx:91Jdt7095 Ordered   8  Furosemide 20 MG Oral Tablet; Take 1 tablet daily; Therapy: 19Hbe8934 to (Evaluate:02Apr2018)  Requested for: 07Apr2017; Last   Rx:07Apr2017 Ordered   9  Levothyroxine Sodium 125 MCG Oral Tablet; Take One tablet by mouth in the morning;    Therapy: 73SZV7354 to (Last Swathi Castillo)  Requested for: 21Jun2017 Ordered   10  Metoprolol Succinate ER 25 MG Oral Tablet Extended Release 24 Hour; take 1/2 tablet    daily; Therapy: 07Apr2017 to (Evaluate:24Mar2018)  Requested for: 90BEO5813; Last    Rx:86Maq4192 Ordered   11  Nystatin-Triamcinolone 072796-1 1 UNIT/GM-% External Cream; APPLY SPARINGLY TO    AFFECTED AREA(S) 3 TIMES A DAY; Therapy: 73Ddu1718 to (Delfin Montez)  Requested for: 66Ynd4277; Last    Rx:66Oak3647 Ordered   12  Potassium Chloride ER 20 MEQ Oral Tablet Extended Release; Take 1 tablet daily; Therapy: 07Apr2017 to (Evaluate:24Mar2018)  Requested for: 57APD8649; Last    Rx:86Mck1950 Ordered   13  Sertraline HCl - 25 MG Oral Tablet; take one tablet by mouth every day; Therapy: 95VWV3946 to (UECLFYTM:45KDC8922)  Requested for: 34ITF5918; Last    Rx:13Oct2017 Ordered   14  Vitamin D TABS; Therapy: (Recorded:87Ayk8546) to Recorded    Allergies    1  Corticosteroids   2  Levaquin TABS   3   Lyrica CAPS    Signatures   Electronically signed by : Emy Suresh MD; Oct 21 2017  8:34AM EST                       (Co-author)

## 2018-01-14 VITALS
HEIGHT: 63 IN | SYSTOLIC BLOOD PRESSURE: 138 MMHG | BODY MASS INDEX: 24.41 KG/M2 | DIASTOLIC BLOOD PRESSURE: 70 MMHG | HEART RATE: 84 BPM | TEMPERATURE: 98 F | WEIGHT: 137.8 LBS

## 2018-01-14 VITALS
OXYGEN SATURATION: 98 % | HEART RATE: 82 BPM | DIASTOLIC BLOOD PRESSURE: 82 MMHG | BODY MASS INDEX: 25.18 KG/M2 | TEMPERATURE: 97 F | HEIGHT: 62 IN | WEIGHT: 136.81 LBS | SYSTOLIC BLOOD PRESSURE: 124 MMHG | RESPIRATION RATE: 15 BRPM

## 2018-01-15 DIAGNOSIS — C50.919 MALIGNANT NEOPLASM OF FEMALE BREAST (HCC): ICD-10-CM

## 2018-01-16 NOTE — PROGRESS NOTES
Assessment    1  Encounter for preventive health examination (V70 0) (Z00 00)   2  Breast cancer (174 9) (C50 919)   · right   3  Age-related cognitive decline (294 9) (R41 81)   4  Hypertension (401 9) (I10)   5  Hyperlipidemia (272 4) (E78 5)    Plan  Health Maintenance    · Eat a low fat and low cholesterol diet ; Status:Complete;   Done: 69OQP6489   · Regular aerobic exercise can help reduce stress ; Status:Complete;   Done: 09TGD5194   · Use a sun block product with an SPF of 15 or more ; Status:Complete;   Done:  65DHS9937   · Follow-up visit in 6 months Evaluation and Treatment  Follow-up  Status: Hold For -  Scheduling  Requested for: 28Jun2016    Discussion/Summary    Refroze seborrheic lesion on L anterior chest  BP excellent   Labs reviewed in detail and copy given  Thyroid well replaced  cognitive changes are mild and static  Impression: Subsequent Annual Wellness Visit, with preventive exam as well as age and risk appropriate counseling completed  Cardiovascular screening and counseling: screening is current  Diabetes screening and counseling: screening is current  Colorectal cancer screening and counseling: screening not indicated  Breast cancer screening and counseling: screening is current  Cervical cancer screening and counseling: screening not indicated  Osteoporosis screening and counseling: the risks and benefits of screening were discussed and the patient declines screening  Abdominal aortic aneurysm screening and counseling: screening not indicated  HIV screening and counseling: screening not indicated  Immunizations: influenza vaccine is up to date this year, the lifetime pneumococcal vaccine has been completed, hepatitis B vaccination series is not indicated at this time due to the patient's low risk of josé the disease, the patient declines the Zostavax vaccine and the patient declines the Td vaccine  Advance Directive Planning: complete and up to date  Advice and education were given regarding fall risk reduction  She was referred to none  Medical Equipment/Suppliers: none  Patient Discussion: plan discussed with the patient, follow-up visit needed in 6 months  Chief Complaint  HM      History of Present Illness  HPI: No recent illness or injury  Memory issues are pretty stable  Only with names and simple issues  Welcome to [employer] and Wellness Visits: The patient is being seen for the subsequent annual wellness visit  Medicare Screening and Risk Factors   Hospitalizations: she has been previously hospitalizied and she has been hospitalized 2 times  Once per lifetime medicare screening tests: ECG ~U() and AAA screening US has not yet been done  Medicare Screening Tests Risk Questions   Abdominal aortic aneurysm risk assessment: none indicated  Osteoporosis risk assessment: , female gender and over 48years of age  HIV risk assessment: none indicated  Drug and Alcohol Use: The patient has never smoked cigarettes  The patient reports never drinking alcohol  She has never used illicit drugs  Diet and Physical Activity: Current diet includes well balanced meals  She exercises infrequently  Mood Disorder and Cognitive Impairment Screening:   Depression screening score was   negative for symptoms  She denies feeling down, depressed, or hopeless over the past two weeks  She denies feeling little interest or pleasure in doing things over the past two weeks  Cognitive impairment screening: denies difficulty learning/retaining new information, denies difficulty handling complex tasks, denies difficulty with reasoning, denies difficulty with spatial ability and orientation, denies difficulty with language and denies difficulty with behavior  Functional Ability/Level of Safety: Hearing is normal bilaterally and a hearing aid is not used   The patient is currently unable to drive, but able to do activities of daily living without limitations, able to do instrumental activities of daily living without limitations and able to participate in social activities without limitations  Activities of daily living details: transportation help needed, but does not need help using the phone, does not need help shopping, no meal preparation help needed, does not need help doing housework, does not need help doing laundry, does not need help managing medications and does not need help managing money  Fall risk factors:  no mobility impairment, no antidepressant use, no sedative use, no visual impairment, no urinary incontinence and no cognitive impairment  Home safety risk factors:  no unfamiliar surroundings, no loose rugs and no poor household lighting  Advance Directives: Advance directives: living will, durable power of  for health care directives and advance directives  end of life decisions were not reviewed with the patient  Co-Managers and Medical Equipment/Suppliers: See Patient Care Team   Reviewed Updated [facility]:   Last Medicare Wellness Visit Information was reviewed, patient interviewed and updates made to the record today  Patient Care Team    Care Team Member Role Specialty Office Number   VIRGILIO Hughes MD Specialist Hematology Oncology [phone]   Jacque Diaz MD Specialist Surgical Oncology [phone]   [address] [place] [phone]     Review of Systems    Constitutional: no chills and no malaise  ENT: no earache and no nasal discharge  Cardiovascular: no chest pain and no palpitations  Respiratory: no shortness of breath  Gastrointestinal: no abdominal pain  Genitourinary: no dysuria  Musculoskeletal: no diffuse joint pain  Integumentary and Breasts: no rashes  Neurological: dizziness, but no headache  Psychiatric: no insomnia  Hematologic and Lymphatic: no tendency for easy bruising  Active Problems    1  Abnormal weight loss (410 83) (R63 4)   2   Actinic keratosis (702 0) (L57 0)   3  Age-related cognitive decline (294 9) (R41 81)   4  Anxiety (300 00) (F41 9)   5  Benign paroxysmal positional vertigo (386 11) (H81 10)   6  Breast cancer (174 9) (C50 919)   7  Carcinoma in situ of breast (233 0) (D05 90)   8  Compression fracture of thoracic vertebra, sequela (905 1) (S22 000S)   9  Dizziness (780 4) (R42)   10  Feeling weak (780 79) (R53 1)   11  Flu vaccine need (V04 81) (Z23)   12  Glaucoma (365 9) (H40 9)   13  Herpes zoster (053 9) (B02 9)   14  Hyperglycemia (790 29) (R73 9)   15  Hyperlipidemia (272 4) (E78 5)   16  Hypertension (401 9) (I10)   17  Hypothyroidism (244 9) (E03 9)   18  Inflamed seborrheic keratosis (702 11) (L82 0)   19  Insomnia (780 52) (G47 00)   20  Need for vaccination with 13-polyvalent pneumococcal conjugate vaccine (V03 82) (Z23)   21  Screening for osteoporosis (V82 81) (Z13 820)   22  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)   23  Thoracic back pain (724 1) (M54 6)   24  Tinea pedis (110 4) (B35 3)   25  Transient ischemic attack (TIA) (435 9) (G45 9)   26  Visit for routine gyn exam (V72 31) (Z01 419)   27  Vitamin D deficiency (268 9) (E55 9)    Past Medical History    · 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)   · History of athlete's foot (V12 09) (Z86 19)   · History of pneumothorax (V12 69) (Z87 09)   · History of Tuberculosis (V12 01)    The active problems and past medical history were reviewed and updated today  Surgical History    · History of Breast Surgery Mastectomy   · History of [place] Lymph Node Biopsy   · History of Total Abdominal Hysterectomy With Removal Of Both Ovaries    The surgical history was reviewed and updated today         Family History  Mother    · Family history of Breast Cancer (V16 3)  Father    · Family history of Adenocarcinoma Of Large Intestine (V16 0)  Sister    · Family history of Gastric Cancer (V16 0)  Family History    · Family history of Cancer    The family history was reviewed and updated today  Social History    · Marital History - Currently    · Never A Smoker   · Never Drank Alcohol   · Non-smoker (V49 89) (Z78 9)  The social history was reviewed and updated today  The social history was reviewed and is unchanged  Current Meds   1  [employer] Vitamin C 500 MG TABS; Therapy: (Recorded:07May2015) to Recorded   2  Aspirin EC Low Dose 81 MG Oral Tablet Delayed Release; TAKE 2 TABLET DAILY; Therapy: (Recorded:07May2015) to Recorded   3  Atorvastatin Calcium 40 MG Oral Tablet; TAKE 1 TABLET BY MOUTH DAILY; Therapy: 35BRL1449 to (Evaluate:03Mar2017)  Requested for: 48XZF5771; Last   Rx:08Mar2016 Ordered   4  Caltrate 600 TABS; twice daily; Therapy: () to Recorded   5  Hydrochlorothiazide 12 5 MG Oral Capsule; take 1 capsule by mouth once daily; Therapy: 98KZM5584 to (Evaluate:10Feb2017)  Requested for: 92LIT3866; Last   Rx:16Feb2016 Ordered   6  Labetalol HCl - 100 MG Oral Tablet; TAKE 1 TABLET EVERY EVENING; Therapy: 33MRT0516 to (Dick Johnson)  Requested for: 41JZB2201; Last   Rx:08Mar2016 Ordered   7  Levothyroxine Sodium 100 MCG Oral Tablet; Take 1 tablet daily; Therapy: 11BMG2008 to (Evaluate:10Feb2017)  Requested for: 16KLN6800; Last   Rx:81Ofu9223 Ordered   8  Meclizine HCl - 25 MG Oral Tablet; TAKE 1 TABLET BY MOUTH EVERY 8 HOURS as   needed for dizziness; Therapy: 12RGH2169 to (Vicki Landaverde)  Requested for: 02WDO5272; Last   Rx:71Adj8684 Ordered   9  Timolol Maleate 0 5 % Ophthalmic Solution; Therapy: 43LXL6804 to (Last Rx:56Vdb3848)  Requested for: 67Mth1676 Ordered   10  Valsartan 160 MG Oral Tablet; TAKE 1/2 TABLET DAILY; Therapy: 70UJQ6567 to (Dick Johnson)  Requested for: 77ZNM2911; Last    Rx:08Mar2016 Ordered   11  Vitamin D TABS; Therapy: () to Recorded   12  Xalatan 0 005 % Ophthalmic Solution;     Therapy: 51Xso9236 to (Last Rx:28Nsi9454)  Requested for: 94Tui1716 Ordered    The medication list was reviewed and updated today  Allergies    1  Corticosteroids   2  Levaquin TABS    Immunizations   ** Printed in Appendix #1 below  Vitals  Signs [Data Includes: Current Encounter]    Temperature: 97 9 F, Tympanic  Heart Rate: 68, L Radial  Pulse Quality: Regular  Systolic: 323, RUE, Sitting  Diastolic: 80, RUE, Sitting  Height: 5 ft 4 in  Weight: 143 lb 12 8 oz  BMI Calculated: 24 68  BSA Calculated: 1 71    Physical Exam    Constitutional   General appearance: No acute distress, well appearing and well nourished  Eyes   Conjunctiva and lids: No swelling, erythema or discharge  Ears, Nose, Mouth, and Throat   External inspection of ears and nose: Normal     Neck   Neck: Supple, symmetric, trachea midline, no masses  Thyroid: Normal, no thyromegaly  Pulmonary   Respiratory effort: No increased work of breathing or signs of respiratory distress  Auscultation of lungs: Clear to auscultation  Cardiovascular   Auscultation of heart: Normal rate and rhythm, normal S1 and S2, no murmurs  Carotid pulses: 2+ bilaterally  Peripheral vascular exam: Normal     Examination of extremities for edema and/or varicosities: Normal     Lymphatic   Palpation of lymph nodes in neck: No lymphadenopathy  Musculoskeletal   Gait and station: Normal     Psychiatric   Judgment and insight: Normal     Orientation to person, place, and time: Normal     Recent and remote memory: Intact      Mood and affect: Normal        Results/Data  PHQ-9 Adult Depression Screening 28Jun2016 09:57AM Manjinder Clement     Test Name Result Flag Reference   PHQ-9 Adult Depression Score 0     Q1: 0, Q2: 0, Q3: 0, Q4: 0, Q5: 0, Q6: 0, Q7: 0, Q8: 0, Q9: 0   PHQ-9 Adult Depression Screening Negative     PHQ-9 Difficulty Level Not difficult at all     PHQ-9 Severity No Depression         Procedure    Procedure:   Results: 20/50 in the right eye with corrective device, 20/100 in the left eye with corrective device      Future Appointments    Date/Time Provider Specialty Site   2017 10:00 AM SANFORD Barba  Hematology Oncology [facility]   2016 09:45 AM SANFORD Garrett   Surgical Oncology [facility]   07/15/2016 03:40 PM Libby Corcoran MD Family Medicine Elmer Angeles MD   2016 09:40 AM MD Imtiaz Bernard MD     Signatures   Electronically signed by : Sulma Alcazar MD; 2016 10:47AM EST                       (Author)    Appendix #1     Patient: Arabella Barlow ; : 1927; MRN: [id number]      1 2 3    Fluzone High-Dose 0 5 ML Intramuscular Suspension Prefilled Syringe  16TFB0103      Influenza  13QAU5347 76YZS0317 05KIF2139    PNEUMO (POLY)  Oct 1999 Sep 2009     Prevnar 13 Intramuscular Suspension  02JUV1083      Zoster  Temporarily Deferred: Pt requests deferral

## 2018-01-16 NOTE — RESULT NOTES
Verified Results  (1) CORTISOL RANDOM 49SDD3126 12:00AM Leobardo Sears     Test Name Result Flag Reference   Cortisol 17 5 ug/dL  2 3-19 4   Cortisol AM         6 2 - 19 4                                         Cortisol PM         2 3 - 11 9                 **Effective January 11, 2016 Cortisol reference**                   interval will be changing to only report:                                         Cortisol AM         6 2 - 19 4                                         Cortisol PM         2 3 - 11 9     (1) VITAMIN B12 48SQS8987 12:00AM Leobardo Sears     Test Name Result Flag Reference   Vitamin B12 557 pg/mL  211-946        Pt aware  1      1 Amended By: Vinicius Bernard; Jan 11 2016 1:46 PM EST    Discussion/Summary   vitamin B level is good

## 2018-01-17 NOTE — MISCELLANEOUS
Assessment    1  Compression fracture of thoracic vertebra, sequela (905 1) (S22 000S)   2  Hypertension (401 9) (I10)   3  PAF (paroxysmal atrial fibrillation) (427 31) (I48 0)   4  Hypothyroidism (244 9) (E03 9)   5  Anxiety (300 00) (F41 9)   6  Age-related cognitive decline (294 9) (R41 81)    Plan  Compression fracture of thoracic vertebra, sequela    · Follow-up visit in 1 month Evaluation and Treatment  Follow-up  Status: Complete  Done:  63QNM3741   Ordered; For: Compression fracture of thoracic vertebra, sequela; Ordered By: Maxie Phoenix Performed:  Due: 17KUQ9920; Last Updated By: Mi Ray; 3/16/2017 11:31:04 AM    Discussion/Summary  Discussion Summary:   Hospital records reviewed in dtail  Compression fracture clinically improving  Cognitive difficulty improving since at home  Continue home PT  Chief Complaint  Chief Complaint Free Text Note Form: ML      History of Present Illness  TCM Communication [facility]: The patient is being contacted for follow-up after hospitalization  She was hospitalized at [place] [address]  The dates of hospitalization:, date of admission: 3/2/17, date of discharge: 3/4/17  Diagnosis: Severe costipation/A-fib  She was discharged to home  Symptoms: back pain left side, back pain right side and constipation, but no fever, no weakness, no dizziness, no headache, no fatigue, no cough, no shortness of breath, no chest pain, no arm pain left side, no leg pain on left side, no leg pain on right side, no upper abdominal pain, no middle abdominal pain, no lower abdominal pain, no rash:, no anorexia, no nausea, no vomiting, no loose stools, no pain with urinating, no incisional pain, no wound drainage and no swelling     Communication performed and completed by Ariana Gupta   HPI: Still having pain in her back and is using a walker  No prolonged palpitations but does get some  Home nurse BP's are all low  Had some confusion in the hospital and was given Quetiapine  Doing better at home and hasn't used it since      Review of Systems  Complete-Female:   Constitutional: not feeling poorly and not feeling tired  Cardiovascular: no chest pain and no palpitations  Respiratory: no cough and no shortness of breath during exertion  Gastrointestinal: no nausea  Musculoskeletal: arthralgias and myalgias  Neurological: dizziness  Psychiatric: anxiety, but no sleep disturbances and no depression  Active Problems    1  Actinic keratosis (702 0) (L57 0)   2  Age-related cognitive decline (294 9) (R41 81)   3  Anxiety (300 00) (F41 9)   4  Benign paroxysmal positional vertigo (386 11) (H81 10)   5  Breast cancer (174 9) (C50 919)   6  Compression fracture of thoracic vertebra, sequela (905 1) (S22 000S)   7  Dizziness (780 4) (R42)   8  Feeling weak (780 79) (R53 1)   9  Glaucoma (365 9) (H40 9)   10  Hyperglycemia (790 29) (R73 9)   11  Hyperlipidemia (272 4) (E78 5)   12  Hypertension (401 9) (I10)   13  Hypothyroidism (244 9) (E03 9)   14  Inflamed seborrheic keratosis (702 11) (L82 0)   15  Insomnia (780 52) (G47 00)   16  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)   17  Thoracic back pain (724 1) (M54 6)   18  Tinea pedis (110 4) (B35 3)   19  Transient ischemic attack (TIA) (435 9) (G45 9)   20  Vitamin D deficiency (268 9) (E55 9)    Past Medical History    1  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)   2  History of athlete's foot (V12 09) (Z86 19)   3  History of breast cancer (V10 3) (Z85 3)   4  History of pneumothorax (V12 69) (Z87 09)   5  Hypertension (401 9) (I10)   6  History of Tuberculosis (V12 01)   7  History of URTI (acute upper respiratory infection) (465 9) (J06 9)    Surgical History    1  History of Breast Surgery Mastectomy   2  History of Cataract Surgery   3  History of [place] Lymph Node Biopsy   4   History of Total Abdominal Hysterectomy With Removal Of Both Ovaries  Surgical History Reviewed: The surgical history was reviewed and updated today  Family History  Mother    1  Family history of Breast Cancer (V16 3)  Father    2  Family history of Adenocarcinoma Of Large Intestine (V16 0)  Sister    3  Family history of Gastric Cancer (V16 0)  Family History    4  Family history of Cancer   5  Family history of malignant neoplasm (V16 9) (Z80 9)   6  Family history of stroke (V17 1) (Z82 3)   7  Family history of thyroid disease (V18 19) (Z83 49)  Family History Reviewed: The family history was reviewed and updated today  Social History    · Marital History - Currently    · Never A Smoker   · Never Drank Alcohol   · Non-smoker (V49 89) (Z78 9)  Social History Reviewed: The social history was reviewed and updated today  The social history was reviewed and is unchanged  Current Meds   1  [employer] Vitamin C 500 MG TABS; Therapy: (Recorded:41Lpz5402) to Recorded   2  Aspir-81 TBEC; Therapy: (Recorded:20Qce4736) to Recorded   3  Aspirin EC Low Dose 81 MG Oral Tablet Delayed Release; TAKE 2 TABLET DAILY; Therapy: () to Recorded   4  Atorvastatin Calcium 40 MG Oral Tablet; TAKE 1 TABLET BY MOUTH DAILY; Therapy: 78XOA2641 to (Evaluate:03Mar2017)  Requested for: 46OCH2036; Last   Rx:08Mar2016 Ordered   5  Caltrate 600 TABS; twice daily; Therapy: () to Recorded   6  Combigan 0 2-0 5 % Ophthalmic Solution; Therapy: (Recorded:19Jhd2003) to Recorded   7  Cyclobenzaprine HCl - 5 MG Oral Tablet; 1-2 tabs PO q 8 hrs prn muscle spasm; Therapy: 49RQX8250 to (Evaluate:26Mar2017)  Requested for: 62NFT3750; Last   Rx:24Feb2017 Ordered   8  Diclofenac Sodium 50 MG Oral Tablet Delayed Release; take 1 tablet twice daily after   meals; Therapy: 81XGP2091 to (Evaluate:05Apr2017); Last Rx:04Feb2017 Ordered   9  HydroCHLOROthiazide 12 5 MG Oral Capsule; take 1 capsule by mouth once daily;    Therapy: 00VRP3524 to (Evaluate:10Feb2017)  Requested for: 20KBQ6655; Last   Rx:16Feb2016 Ordered   10  Labetalol HCl - 100 MG Oral Tablet; TAKE 1 TABLET EVERY EVENING; Therapy: 73ZZI8954 to (Rick Washington)  Requested for: 95MOI8979; Last    Rx:08Mar2016 Ordered   11  Lansoprazole 30 MG TBDP; Therapy: (Recorded:93Rlo9364) to Recorded   12  Levothyroxine Sodium 100 MCG Oral Tablet; Take 1 tablet daily; Therapy: 41RFH1133 to (Evaluate:30Jan2018)  Requested for: 27LDG7496; Last    Rx:04Feb2017 Ordered   13  Polyethylene Glycol 3350 Oral Powder; Take every other night and if no BM longer than 2    days every night until having regular BM's;    Therapy: 00MAZ6978 to (Evaluate:29Mar2017); Last Rx:27Feb2017 Ordered   14  Senokot 8 6 MG Oral Tablet; Take 2 Daily for constipation; Therapy: 57HQY3513 to (Evaluate:29Mar2017); Last Rx:27Feb2017 Ordered   15  Tramadol-Acetaminophen 37 5-325 MG Oral Tablet; TAKE 1 TABLET EVERY 8 HOURS    AS NEEDED; Therapy: 36JAK6882 to (Evaluate:28Apr2017); Last Rx:27Feb2017 Ordered   16  Valsartan 160 MG Oral Tablet; TAKE 1/2 TABLET DAILY; Therapy: 39HDA5200 to (Rick Washington)  Requested for: 11ORG2553; Last    Rx:08Mar2016 Ordered   17  Vitamin D TABS; Therapy: () to Recorded   18  Xalatan 0 005 % Ophthalmic Solution; Therapy: 68KPC9323 to (Last Rx:03Feb2011)  Requested for: 45Bry5145 Ordered    Allergies    1  Corticosteroids   2  Levaquin TABS    Vitals  Signs   Recorded: 08IIU0011 10:49AM   Temperature: 97 9 F, Tympanic  Heart Rate: 64, L Radial  Pulse Quality: Regular  Systolic: 328, LUE, Sitting  Diastolic: 78, LUE, Sitting  Height: 5 ft 3 in  Weight: 135 lb 12 8 oz  BMI Calculated: 24 06  BSA Calculated: 1 64    Physical Exam    Constitutional   General appearance: No acute distress, well appearing and well nourished  Pulmonary   Respiratory effort: No increased work of breathing or signs of respiratory distress      Auscultation of lungs: Clear to auscultation  Cardiovascular   Auscultation of heart: Normal rate and rhythm, normal S1 and S2, without murmurs  Examination of extremities for edema and/or varicosities: Normal     Carotid pulses: Normal     Musculoskeletal   Gait and station: Abnormal   in wheelchair  Future Appointments    Date/Time Provider Specialty Site   01/22/2018 10:30 AM SANFORD La  Hematology Oncology [facility]   05/04/2017 10:00 AM Cathryne Brunner, M D   Surgical Oncology [place] [address] [facility]   04/17/2017 02:40 PM Ada Infante MD Family Medicine Lucy Domingo MD   06/21/2017 10:00 AM Ada Infante MD Family Medicine Lucy Domingo MD     Signatures   Electronically signed by : Niharika Han MD; Mar 17 2017  5:03AM EST                       (Author)

## 2018-01-17 NOTE — MISCELLANEOUS
Message   Recorded as Task   Date: 03/07/2017 03:46 PM, Created By: Theresa Witt   Task Name: Medical Complaint Callback   Assigned To: [facility]   Regarding Patient: Alex Coats, Status: Active   Comment:    Theresa Witt - 07 Mar 2017 3:46 PM     TASK Jenaro Mooney from [address] [place] [address] Nurses calling because she needs to reconcile multiple medications for the patient  She can be reached at [phone]  [facility] - 07 Mar 2017 4:49 PM     TASK REASSIGNED: Previously Assigned To Yolandastad - 08 Mar 2017 8:53 AM     TASK EDITED  LM for Iliana Nelson to call back  Aria Navas - 08 Mar 2017 9:52 AM     TASK EDITED  Med list was faxed over  Dr Mar Dukes reviewed the list & said that it is ok to stop the Flexeril, Voltaren, Seroquel  No need for the [place] unless Alonzo Alexander develops heart burn from the full dose ASA  It is ok for her to start OT & she does not need to follow w/Cardiology at this point  Iliana Nelson is aware & will relay  to Danita's daughter  Active Problems    1  Actinic keratosis (702 0) (L57 0)   2  Age-related cognitive decline (294 9) (R41 81)   3  Anxiety (300 00) (F41 9)   4  Benign paroxysmal positional vertigo (386 11) (H81 10)   5  Breast cancer (174 9) (C50 919)   6  Compression fracture of thoracic vertebra, sequela (905 1) (S22 000S)   7  Dizziness (780 4) (R42)   8  Feeling weak (780 79) (R53 1)   9  Glaucoma (365 9) (H40 9)   10  Hyperglycemia (790 29) (R73 9)   11  Hyperlipidemia (272 4) (E78 5)   12  Hypertension (401 9) (I10)   13  Hypothyroidism (244 9) (E03 9)   14  Inflamed seborrheic keratosis (702 11) (L82 0)   15  Insomnia (780 52) (G47 00)   16  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)   17  Thoracic back pain (724 1) (M54 6)   18  Tinea pedis (110 4) (B35 3)   19  Transient ischemic attack (TIA) (435 9) (G45 9)   20   Vitamin D deficiency (268 9) (E55 9)    Current Meds   1  [facility] 500 MG TABS; Therapy: (Recorded:03Lyu1526) to Recorded   2  Aspir-81 TBEC; Therapy: (Recorded:27Feb2017) to Recorded   3  Aspirin EC Low Dose 81 MG Oral Tablet Delayed Release; TAKE 2 TABLET DAILY; Therapy: () to Recorded   4  Atorvastatin Calcium 40 MG Oral Tablet; TAKE 1 TABLET BY MOUTH DAILY; Therapy: 22SXI9063 to (Evaluate:03Mar2017)  Requested for: 21FBV6624; Last   Rx:08Mar2016 Ordered   5  Caltrate 600 TABS; twice daily; Therapy: () to Recorded   6  Combigan 0 2-0 5 % Ophthalmic Solution; Therapy: (Recorded:27Feb2017) to Recorded   7  Cyclobenzaprine HCl - 5 MG Oral Tablet; 1-2 tabs PO q 8 hrs prn muscle spasm; Therapy: 69FYP1933 to (Evaluate:26Mar2017)  Requested for: 93TUZ8145; Last   Rx:24Feb2017 Ordered   8  Diclofenac Sodium 50 MG Oral Tablet Delayed Release; take 1 tablet twice daily after   meals; Therapy: 16SEH6532 to (Evaluate:05Apr2017); Last Rx:04Feb2017 Ordered   9  HydroCHLOROthiazide 12 5 MG Oral Capsule; take 1 capsule by mouth once daily; Therapy: 94GMA9588 to (Evaluate:10Feb2017)  Requested for: 63XYI2810; Last   Rx:16Feb2016 Ordered   10  Labetalol HCl - 100 MG Oral Tablet; TAKE 1 TABLET EVERY EVENING; Therapy: 26BPE4942 to (Cathye Slice)  Requested for: 22AFT1775; Last    Rx:08Mar2016 Ordered   11  Lansoprazole 30 MG TBDP; Therapy: (Recorded:27Feb2017) to Recorded   12  Levothyroxine Sodium 100 MCG Oral Tablet; Take 1 tablet daily; Therapy: 83XYO3312 to (Evaluate:30Jan2018)  Requested for: 88LSV2502; Last    Rx:04Feb2017 Ordered   13  Polyethylene Glycol 3350 Oral Powder (MiraLax); Take every other night and if no BM    longer than 2 days every night until having regular BM's;    Therapy: 78ZZC9425 to (Evaluate:29Mar2017); Last Rx:81Ujw5580 Ordered   14  Senokot 8 6 MG Oral Tablet; Take 2 Daily for constipation; Therapy: 90PUN4610 to (Evaluate:29Mar2017);  Last Rx: 67QLB8394 Ordered   15  Tramadol-Acetaminophen 37 5-325 MG Oral Tablet; TAKE 1 TABLET EVERY 8 HOURS    AS NEEDED; Therapy: 04YSY2301 to (Evaluate:28Apr2017); Last Rx:96Znj5864 Ordered   16  Valsartan 160 MG Oral Tablet; TAKE 1/2 TABLET DAILY; Therapy: 24WQH8426 to (Frank [place])  Requested for: 65VZX3125; Last    Rx:08Mar2016 Ordered   17  Vitamin D TABS; Therapy: ([phone]) to Recorded   18  Xalatan 0 005 % Ophthalmic Solution (Latanoprost); Therapy: 47THK3275 to (Last Rx:04Rdy6093)  Requested for: 16Ske3860 Ordered    Allergies    1  Corticosteroids   2  Levaquin TABS    Signatures   Electronically signed by :  Chhaya Patrick, ; Mar  8 2017  9:52AM EST                       (Author)

## 2018-01-18 NOTE — CONSULTS
Chief Complaint  Four month follow up with EKG      History of Present Illness  Followup for afib  Her weight has been variable  Back on diltiazem  Her rates are high today  She is severely depressed  The patient presents with paroxysmal atrial fibrillation  The treatment strategy for this patient is rhythm control  She states her atrial fibrillation has been stable since the last visit  Symptoms: denies palpitations, denies chest pain, stable exercise intolerance, stable dyspnea on exertion and denies dizziness  Associated symptoms include no syncope, no focal neurologic deficit, no tendency for easy bleeding and no tendency for easy bruising  Medications: the patient is adherent with her medication regimen  She denies medication side effects  Review of Systems      Cardiac: rhythm problems, has heart murmur present and has swelling in the     Skin: No complaints of nonhealing sores or skin rash  Genitourinary: No complaints of recurrent urinary tract infections, frequent urination at night, difficult urination, blood in urine, kidney stones, loss of bladder control, kidney problems, denies any birth control or hormone replacement, is not post menopausal, not currently pregnant  Psychological: No complaints of feeling depressed, anxiety, panic attacks, or difficulty concentrating  General: lack of energy/fatigue  Respiratory: shortness of breath  HEENT: No complaints of serious problems, hearing problems, nose problems, throat problems, or snoring  Gastrointestinal: No complaints of liver problems, nausea, vomiting, heartburn, constipation, bloody stools, diarrhea, problems swallowing, adbominal pain, or rectal bleeding  Hematologic: No complaints of bleeding disorders, anemia, blood clots, or excessive brusing     Neurological: No complaints of numbness, tingling, dizziness, weakness, seizures, headaches, syncope or fainting, AM fatigue, daytime sleepiness, no witnessed apnea episodes  Musculoskeletal: arthritis     ROS reviewed  Active Problems    1  Actinic keratosis (702 0) (L57 0)   2  Age-related cognitive decline (294 9) (R41 81)   3  Anxiety (300 00) (F41 9)   4  Bacterial pneumonia (482 9) (J15 9)   5  Benign paroxysmal positional vertigo (386 11) (H81 10)   6  Breast cancer (174 9) (C50 919)   7  Chronic diastolic CHF (congestive heart failure) (428 32,428 0) (I50 32)   8  Compression fracture of thoracic vertebra, sequela (905 1) (S22 000S)   9  Depression with anxiety (300 4) (F41 8)   10  Dizziness (780 4) (R42)   11  Feeling weak (780 79) (R53 1)   12  Flu vaccine need (V04 81) (Z23)   13  Glaucoma (365 9) (H40 9)   14  Hyperglycemia (790 29) (R73 9)   15  Hyperlipidemia (272 4) (E78 5)   16  Hypertension (401 9) (I10)   17  Hypothyroidism (244 9) (E03 9)   18  Insomnia (780 52) (G47 00)   19  Lower leg edema (782 3) (R60 0)   20  PAF (paroxysmal atrial fibrillation) (427 31) (I48 0)   21  Postural dizziness with presyncope (780 4,780 2) (R42,R55)   22  Screening for genitourinary condition (V81 6) (Z13 89)   23  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)   24  Thoracic back pain (724 1) (M54 6)   25  Thoracic neuralgia (729 2) (M79 2)   26  Tinea pedis (110 4) (B35 3)   27  Tinea pedis of right foot (110 4) (B35 3)   28  Transient ischemic attack (TIA) (435 9) (G45 9)   29  Vitamin D deficiency (268 9) (E55 9)    Past Medical History    · Denied: History of Alcohol abuse   · 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)   · History of athlete's foot (V12 09) (Z86 19)   · History of breast cancer (V10 3) (Z85 3)   · History of pneumothorax (V12 69) (Z87 09)   · Denied: History of substance abuse   · Hypertension (401 9) (I10)   · History of Tuberculosis (V12 01)   · History of URTI (acute upper respiratory infection) (465 9) (J06 9)    The active problems and past medical history were reviewed and updated today  Surgical History    · History of Breast Surgery Mastectomy   · History of Cataract Surgery   · History of [place] Lymph Node Biopsy   · History of Total Abdominal Hysterectomy With Removal Of Both Ovaries    The surgical history was reviewed and updated today  Family History    · Family history of Breast Cancer (V16 3)    · Family history of Adenocarcinoma Of Large Intestine (V16 0)    · Family history of Gastric Cancer (V16 0)    · Denied: Family history of Alcohol abuse   · Family history of Cancer   · Family history of malignant neoplasm (V16 9) (Z80 9)   · Family history of stroke (V17 1) (Z82 3)   · Denied: Family history of substance abuse   · Family history of thyroid disease (V18 19) (Z83 49)   · Denied: Family history of Mental health problem    The family history was reviewed and updated today  Social History    · Denied: History of Always uses seat belt   · Marital History - Currently    · Never a smoker   · Never Drank Alcohol  The social history was reviewed and updated today  Current Meds   1  [employer] Vitamin C 500 MG TABS; Therapy: (Recorded:85Wyl9836) to Recorded   2  Atorvastatin Calcium 40 MG Oral Tablet; TAKE ONE TABLET BY MOUTH ONCE DAILY; Therapy: 51MXU2982 to (Euell Areola)  Requested for: 27OFK8742; Last   Rx:09Mar2017 Ordered   3  Azithromycin 200 MG/5ML Oral Suspension Reconstituted; 2 tsps for one dose then 1 tsp   for 4 days; Therapy: 88XVS9508 to (Last [place])  Requested for: 20Oct2017 Ordered   4  Calcium 600 MG Oral Tablet; Doesn't take during the summer time; Therapy: 95YVM8772 to Recorded   5  DilTIAZem HCl - 120 MG Oral Tablet; Therapy: 91CZP1014 to  Requested for: 96YXA4415 Recorded   6  Eliquis 2 5 MG Oral Tablet; Take 1 tablet twice daily; Therapy: 68PCK2563 to (Euell Areola)  Requested for: 53Gqh1457; Last   Rx:51Kto9521 Ordered   7  Furosemide 20 MG Oral Tablet; Take 1 tablet daily;    Therapy: 07Apr2017 to (Epi Brady)  Requested for: 07Apr2017; Last   Rx:07Apr2017 Ordered   8  Levothyroxine Sodium 125 MCG Oral Tablet; Take One tablet by mouth in the morning; Therapy: 35DNL0884 to (Last Yuliet Valdez)  Requested for: 21Jun2017 Ordered   9  Metoprolol Succinate ER 25 MG Oral Tablet Extended Release 24 Hour; take 1/2 tablet   daily; Therapy: 07Apr2017 to (Evaluate:24Mar2018)  Requested for: 50RWZ0904; Last   Rx:63Myr2058 Ordered   10  Nystatin-Triamcinolone 745477-7 1 UNIT/GM-% External Cream; APPLY SPARINGLY TO    AFFECTED AREA(S) 3 TIMES A DAY; Therapy: 13Zsk7538 to (Omar Ahumada)  Requested for: 29Jis7883; Last    Rx:61Gky9297 Ordered   11  Potassium Chloride ER 20 MEQ Oral Tablet Extended Release; Take 1 tablet daily; Therapy: 07Apr2017 to (Evaluate:24Mar2018)  Requested for: 65FIW0417; Last    Rx:11Nfl5627 Ordered   12  Sertraline HCl - 25 MG Oral Tablet; take one tablet by mouth every day; Therapy: 28NPF5831 to (ZJGHGOAI:61KXP8322)  Requested for: 78EEC6004; Last    Rx:14Exs5257 Ordered   13  Vitamin D TABS; Therapy: ([phone]) to Recorded    The medication list was reviewed and updated today  Allergies    1  Corticosteroids   2  Levaquin TABS   3  Lyrica CAPS    Vitals   Recorded: 20FYI0243 02:48PM Recorded: 59IZL7617 02:27PM   Heart Rate  121   Systolic 772 587   Diastolic 60 68   Height  5 ft 5 in   Weight  119 lb    BMI Calculated  19 8   BSA Calculated  1 59     Physical Exam    Constitutional   General appearance: No acute distress, well appearing and well nourished  Eyes   Conjunctiva and Sclera examination: Conjunctiva pink, sclera anicteric  Ears, Nose, Mouth, and Throat - Oropharynx: Clear, nares are clear, mucous membranes are moist    Neck   Neck and thyroid: Normal, supple, trachea midline, no thyromegaly  Pulmonary   Respiratory effort: No increased work of breathing or signs of respiratory distress      Auscultation of lungs: Clear to auscultation, no rales, no rhonchi, no wheezing, good air movement  Cardiovascular   Auscultation of heart: Abnormal   irregularly irregular  Carotid pulses: Normal, 2+ bilaterally  Peripheral vascular exam: Normal pulses throughout, no tenderness, erythema or swelling  Pedal pulses: Normal, 2+ bilaterally  Examination of extremities for edema and/or varicosities: Normal     Abdomen   Abdomen: Non-tender and no distention  Liver and spleen: No hepatomegaly or splenomegaly  Musculoskeletal Gait and station: Normal gait  Digits and nails: Normal without clubbing or cyanosis  Inspection/palpation of joints, bones, and muscles: Normal, ROM normal     Skin - Skin and subcutaneous tissue: Normal without rashes or lesions  Skin is warm and well perfused, normal turgor  Neurologic - Cranial nerves: II - XII intact  Speech: Normal     Psychiatric - Orientation to person, place, and time: Normal  Mood and affect: Normal       Results/Data    Rhythm and rate: atrial fibrillation   ventricular rate is 119 beats per minute  QRS: the QRS is normal   T waves:   there are nonspecific ST-T wave changes  Assessment    1  Chronic diastolic CHF (congestive heart failure) (428 32,428 0) (I50 32)   2  PAF (paroxysmal atrial fibrillation) (427 31) (I48 0)    Plan  PAF (paroxysmal atrial fibrillation)    · Metoprolol Succinate ER 25 MG Oral Tablet Extended Release 24 Hour; TAKE  0 5 TABLET Every twelve hours   Rx By: Jeani Simmonds; Dispense: 30 Days ; #:30 Tablet Extended Release 24 Hour; Refill: 6; For: PAF (paroxysmal atrial fibrillation); GEMMA = N; Verified Transmission to [facility]; Last Updated By: System, [employer]; 11/6/2017 2:54:05 PM   · EKG/ECG- POC; Status:Complete;   Done: 51ADX8545 02:30PM   Perform: In Office; Last Updated Man Jeff; 11/6/2017 2:30:41 PM;Ordered; For:PAF (paroxysmal atrial fibrillation);  Ordered By:Nicole Sahu;   · HOLTER MONITOR - 24 HOUR; Status:Hold For - Scheduling; Requested  YKX:58AAM2965;    Perform:[facility]; IVS:34FGN6565; Ordered; For:PAF (paroxysmal atrial fibrillation); Ordered By:Ana Sahu;   · Follow-up visit in 6 months Evaluation and Treatment  Follow-up  Status: Complete   Done: 91UCN1830   Ordered; For: PAF (paroxysmal atrial fibrillation); Ordered By: Kiara Gutierrez Performed:  Order Comments: PT WAIT LIST Due: 79AHY8363; Last Updated By: Michela eLon; 11/6/2017 2:57:14 PM    Discussion/Summary    Paroxysmal Atrial Fibrillation: She is in atrial fibrillation today  Her heart rate is around 119 bpm  She will take Metoprolol succinate 12 5 mg twice a day  Continue Eliquis  Chronic Diastolic CHF with elevated left sided filling pressures and pulmonary HTN  Continue lasix  HTN: BP stable on medical on medical therapy  The patient was counseled regarding diagnostic results, instructions for management, risk factor reductions, impressions  total time of encounter was 25 minutes and 15 minutes was spent counseling        Future Appointments    Signatures   Electronically signed by : SANFORD Welch ; Nov 6 2017  3:21PM EST                       (Author)

## 2018-01-22 ENCOUNTER — ALLSCRIPTS OFFICE VISIT (OUTPATIENT)
Dept: OTHER | Facility: OTHER | Age: 83
End: 2018-01-22

## 2018-01-22 ENCOUNTER — GENERIC CONVERSION - ENCOUNTER (OUTPATIENT)
Dept: OTHER | Facility: OTHER | Age: 83
End: 2018-01-22

## 2018-01-22 VITALS
SYSTOLIC BLOOD PRESSURE: 100 MMHG | DIASTOLIC BLOOD PRESSURE: 50 MMHG | HEART RATE: 145 BPM | WEIGHT: 136 LBS | BODY MASS INDEX: 22.63 KG/M2

## 2018-01-22 VITALS
TEMPERATURE: 97.6 F | HEART RATE: 72 BPM | DIASTOLIC BLOOD PRESSURE: 76 MMHG | SYSTOLIC BLOOD PRESSURE: 116 MMHG | WEIGHT: 124.2 LBS | HEIGHT: 65 IN | BODY MASS INDEX: 20.69 KG/M2

## 2018-01-22 VITALS
BODY MASS INDEX: 24.06 KG/M2 | HEIGHT: 63 IN | HEART RATE: 64 BPM | TEMPERATURE: 97.9 F | DIASTOLIC BLOOD PRESSURE: 78 MMHG | WEIGHT: 135.8 LBS | SYSTOLIC BLOOD PRESSURE: 130 MMHG

## 2018-01-22 VITALS
BODY MASS INDEX: 21.06 KG/M2 | HEIGHT: 65 IN | TEMPERATURE: 98.6 F | WEIGHT: 126.4 LBS | DIASTOLIC BLOOD PRESSURE: 80 MMHG | HEART RATE: 108 BPM | SYSTOLIC BLOOD PRESSURE: 124 MMHG

## 2018-01-23 ENCOUNTER — GENERIC CONVERSION - ENCOUNTER (OUTPATIENT)
Dept: OTHER | Facility: OTHER | Age: 83
End: 2018-01-23

## 2018-01-23 VITALS
WEIGHT: 142.2 LBS | TEMPERATURE: 97.1 F | BODY MASS INDEX: 23.69 KG/M2 | HEART RATE: 84 BPM | DIASTOLIC BLOOD PRESSURE: 50 MMHG | HEIGHT: 65 IN | SYSTOLIC BLOOD PRESSURE: 96 MMHG

## 2018-01-23 LAB
T4 FREE SERPL-MCNC: 1.4 NG/DL (ref 0.82–1.77)
TSH SERPL DL<=0.05 MIU/L-ACNC: 9.77 UIU/ML (ref 0.45–4.5)

## 2018-01-23 NOTE — PROGRESS NOTES
Assessment   Assessed    1  Chronic diastolic CHF (congestive heart failure) (428 32,428 0) (I50 32)   2  PAF (paroxysmal atrial fibrillation) (427 31) (I48 0)    Plan   Chronic diastolic CHF (congestive heart failure)    · MetOLazone 2 5 MG Oral Tablet; take 1 tablet daily prn   Rx By: Gertrudis Diaz; Dispense: 0 Days ; #:15 Tablet; Refill: 4;For: Chronic diastolic CHF (congestive heart failure); GEMMA = N; Verified Transmission to [facility]15; Last Updated By: System, SureScripts; 1/22/2018 1:30:23 PM  PAF (paroxysmal atrial fibrillation)    · (1) BASIC METABOLIC PROFILE; Status:Active; Requested for:22Jan2018; Perform:[facility]; GJJ:[id number];YIXOOAD; For:PAF (paroxysmal atrial fibrillation); Ordered By:Wilian Sahu;   · Follow-up visit in 3 months Evaluation and Treatment  Follow-up  Status: Complete     Done: 22Apr2018   Ordered; For: PAF (paroxysmal atrial fibrillation); Ordered By: Gertrudis Diaz Performed:  Due: 65IFI0439; Last Updated By: Pb Taveras; 1/22/2018 1:28:03 PM    Discussion/Summary   Cardiology Discussion Summary Free Text Note Form [facility]:    Paroxysmal Atrial Fibrillation: She is in atrial fibrillation today  Continue Metoprolol and Eliquis  Diastolic CHF with elevated left sided filling pressures and pulmonary HTN  She appears to be carrying about ten pounds of extra volume  She will take as needed metolazone  She will take one metolazone tomorrow thirty minutes prior to her lasix dose  May end up taking metolazone once a week but will coordinate this with visiting nursing  BP stable on medical on medical therapy  Counseling Documentation With Imm: The patient was counseled regarding diagnostic results,-- instructions for management,-- risk factor reductions,-- impressions  total time of encounter was 25 minutes-- and-- 15 minutes was spent counseling        Chief Complaint   Chief Complaint Free Text Note Form: 3 month f/u      History of Present Illness   Cardiology HPI Free Text Note Form [facility]: followup for CHF   weight gain and modification of diuretics  Weight has slowly come down  She has no dyspnea at rest, no orthopnea  She has significant LE edema  Review of Systems   Cardiology Female ROS:         Cardiac: rhythm problems,-- has heart murmur present-- and-- has swelling in the        Skin: No complaints of nonhealing sores or skin rash  Genitourinary: No complaints of recurrent urinary tract infections, frequent urination at night, difficult urination, blood in urine, kidney stones, loss of bladder control, kidney problems, denies any birth control or hormone replacement, is not post menopausal, not currently pregnant  Psychological: No complaints of feeling depressed, anxiety, panic attacks, or difficulty concentrating  General: lack of energy/fatigue  Respiratory: shortness of breath  HEENT: No complaints of serious problems, hearing problems, nose problems, throat problems, or snoring  Gastrointestinal: No complaints of liver problems, nausea, vomiting, heartburn, constipation, bloody stools, diarrhea, problems swallowing, adbominal pain, or rectal bleeding  Hematologic: No complaints of bleeding disorders, anemia, blood clots, or excessive brusing  Neurological: No complaints of numbness, tingling, dizziness, weakness, seizures, headaches, syncope or fainting, AM fatigue, daytime sleepiness, no witnessed apnea episodes  Musculoskeletal: arthritis    ROS Reviewed:    ROS reviewed  Active Problems   Problems    1  Actinic keratosis (702 0) (L57 0)   2  Age-related cognitive decline (294 9) (R41 81)   3  Anxiety (300 00) (F41 9)   4  Bacterial pneumonia (482 9) (J15 9)   5  Benign paroxysmal positional vertigo (386 11) (H81 10)   6  Blister of leg (916 2) (S80 829A)   7  Breast cancer (174 9) (C50 919)   8  Chronic diastolic CHF (congestive heart failure) (428 32,428 0) (I50 32)   9   Compression fracture of thoracic vertebra, sequela (905 1) (S22 000S)   10  Depression with anxiety (300 4) (F41 8)   11  Dizziness (780 4) (R42)   12  Feeling weak (780 79) (R53 1)   13  Flu vaccine need (V04 81) (Z23)   14  Glaucoma (365 9) (H40 9)   15  Hyperglycemia (790 29) (R73 9)   16  Hyperlipidemia (272 4) (E78 5)   17  Hypertension (401 9) (I10)   18  Hypothyroidism (244 9) (E03 9)   19  Insomnia (780 52) (G47 00)   20  Lower leg edema (782 3) (R60 0)   21  PAF (paroxysmal atrial fibrillation) (427 31) (I48 0)   22  Postural dizziness with presyncope (780 4,780 2) (R42,R55)   23  Screening for genitourinary condition (V81 6) (Z13 89)   24  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)   25  Thoracic back pain (724 1) (M54 6)   26  Thoracic neuralgia (729 2) (M79 2)   27  Tinea pedis (110 4) (B35 3)   28  Tinea pedis of right foot (110 4) (B35 3)   29  Transient ischemic attack (TIA) (435 9) (G45 9)   30  Venous ulcer of right leg (454 0) (I83 019)   31  Vitamin D deficiency (268 9) (E55 9)    Past Medical History   Problems    1  Denied: History of Alcohol abuse   2  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)   3  History of athlete's foot (V12 09) (Z86 19)   4  History of breast cancer (V10 3) (Z85 3)   5  History of pneumothorax (V12 69) (Z87 09)   6  Denied: History of substance abuse   7  Hypertension (401 9) (I10)   8  History of Tuberculosis (V12 01)   9  History of URTI (acute upper respiratory infection) (465 9) (J06 9)  Active Problems And Past Medical History Reviewed: The active problems and past medical history were reviewed and updated today  Surgical History   Problems    1  History of Breast Surgery Mastectomy   2  History of Cataract Surgery   3  History of [place] Lymph Node Biopsy   4  History of Total Abdominal Hysterectomy With Removal Of Both Ovaries  Surgical History Reviewed: The surgical history was reviewed and updated today         Family History Mother    1  Family history of Breast Cancer (V16 3)  Father    2  Family history of Adenocarcinoma Of Large Intestine (V16 0)  Sister    3  Family history of Gastric Cancer (V16 0)  Family History    4  Denied: Family history of Alcohol abuse   5  Family history of Cancer   6  Family history of malignant neoplasm (V16 9) (Z80 9)   7  Family history of stroke (V17 1) (Z82 3)   8  Denied: Family history of substance abuse   9  Family history of thyroid disease (V18 19) (Z83 49)   10  Denied: Family history of Mental health problem  Family History Reviewed: The family history was reviewed and updated today  Social History   Problems    · Always uses seat belt   · Marital History - Currently    · Never a smoker   · Never Drank Alcohol  Social History Reviewed: The social history was reviewed and updated today  Current Meds    1  [facility] 500 MG TABS; Therapy: (Recorded:72Vec1121) to Recorded   2  Atorvastatin Calcium 40 MG Oral Tablet; TAKE ONE TABLET BY MOUTH ONCE DAILY; Therapy: 32BEW8295 to (Shaun Rivera)  Requested for: 72FNV9332; Last     Rx:09Mar2017 Ordered   3  Calcium 600 MG Oral Tablet; Doesn't take during the summer time; Therapy: 56WRI1518 to Recorded   4  DilTIAZem HCl - 120 MG Oral Tablet; Therapy: 48WJA6253 to  Requested for: 68PQP6801 Recorded   5  Eliquis 2 5 MG Oral Tablet; Take 1 tablet twice daily; Therapy: 70FBM8987 to (Shaun Rivera)  Requested for: 95Wzx0347; Last     Rx:18Ahn3103 Ordered   6  Furosemide 40 MG Oral Tablet; Take 1 tablet twice daily; Therapy: 69HEV3816 to ([phone] 74)  Requested for: 79UHL8134; Last     Rx:06Jan2018 Ordered   7  Levothyroxine Sodium 125 MCG Oral Tablet; Take One tablet by mouth in the morning; Therapy: 89GKC4430 to (Diomedes Beckwith)  Requested for: 21Jun2017 Ordered   8   Metoprolol Succinate ER 25 MG Oral Tablet Extended Release 24 Hour; TAKE 0 5     TABLET Every twelve hours; Therapy: 07Apr2017 to (Evaluate:04Jun2018)  Requested for: 51SXP5012; Last     Rx:06Nov2017 Ordered   9  Nystatin-Triamcinolone 354259-6 1 UNIT/GM-% External Cream; APPLY SPARINGLY TO     AFFECTED AREA(S) 3 TIMES A DAY; Therapy: 25Rzc9772 to (Julian Berry)  Requested for: 30Bix8939; Last     Rx:62Gwc4173 Ordered   10  Potassium Chloride ER 20 MEQ Oral Tablet Extended Release; Take 1 tablet daily; Therapy: 07Apr2017 to (Evaluate:24Mar2018)  Requested for: 31FBF9586; Last      Rx:16Fox7998 Ordered   11  Sertraline HCl - 25 MG Oral Tablet; take one tablet by mouth every day; Therapy: 06NPQ4335 to (05 06 52 16 25)  Requested for: 20YRO3973; Last      Rx:08Jan2018 Ordered   12  Vitamin D TABS; Therapy: ([phone]) to Recorded  Medication List Reviewed: The medication list was reviewed and updated today  Allergies   Medication    1  Corticosteroids   2  Levaquin TABS   3  Lyrica CAPS    Vitals   Vital Signs    Recorded: 12ZYW0335 12:55PM   Heart Rate 90   Systolic 90   Diastolic 62   Weight 910 lb    BMI Calculated 22 96   BSA Calculated 1 69     Physical Exam        Constitutional      General appearance: No acute distress, well appearing and well nourished  Eyes      Conjunctiva and Sclera examination: Conjunctiva pink, sclera anicteric  Ears, Nose, Mouth, and Throat - Oropharynx: Clear, nares are clear, mucous membranes are moist       Neck      Neck and thyroid: Normal, supple, trachea midline, no thyromegaly  Pulmonary      Respiratory effort: No increased work of breathing or signs of respiratory distress  Auscultation of lungs: Clear to auscultation, no rales, no rhonchi, no wheezing, good air movement  Cardiovascular      Auscultation of heart: Normal rate and rhythm, normal S1 and S2, no murmurs  Carotid pulses: Normal, 2+ bilaterally  Peripheral vascular exam: Normal pulses throughout, no tenderness, erythema or swelling  Pedal pulses: Normal, 2+ bilaterally  Examination of extremities for edema and/or varicosities: Normal        Abdomen      Abdomen: Non-tender and no distention  Liver and spleen: No hepatomegaly or splenomegaly  Musculoskeletal Gait and station: Normal gait  -- Digits and nails: Normal without clubbing or cyanosis  -- Inspection/palpation of joints, bones, and muscles: Normal, ROM normal        Skin - Skin and subcutaneous tissue: Normal without rashes or lesions  Skin is warm and well perfused, normal turgor  Neurologic - Cranial nerves: II - XII intact  -- Speech: Normal        Psychiatric - Orientation to person, place, and time: Normal -- Mood and affect: Normal       Future Appointments      Date/Time Provider Specialty Site   04/02/2018 02:00 PM Ute Swift MD Family Medicine Elmer Angeles MD     Signatures    Electronically signed by : SANFORD Sanchez ; Jan 22 2018  6:58PM EST                       (Author)

## 2018-01-24 NOTE — RESULT NOTES
Discussion/Summary    Has Sharda Flores been taking Levothyroxine 125 mcg each morning for her thyroid? If so, I need to do new Rx for higher dose  Spoke to her daughter, Dominick Marquez, and she said that Sharda Flores has been taking all her meds as directed           Verified Results  (1) TSH 22Jan2018 12:07PM [facility] Delay     Test Name Result Flag Reference   TSH 9 770 uIU/mL H 0 450-4 500     [facility]) Thyroxine (T4) Free, Direct, Malgorzata Garcia [id number] 12:07PM Beckie Delay     Test Name Result Flag Reference   T4,Free(Direct) 1 40 ng/dL  0 82-1 77

## 2018-01-24 NOTE — MISCELLANEOUS
Message   Recorded as Task   Date: 01/23/2018 07:28 AM, Created By: Ivette Calles   Task Name: Call Patient with results   Assigned To: [address] [place] [address]   Regarding Patient: Connie Mar, Status: Active   Comment:    Nick Quintero - 23 Jan 2018 7:28 AM     Patient Phone: [phone]   Jennie Luong - 23 Jan 2018 7:29 AM     TASK REASSIGNED: Previously Assigned To Nick QuinteroAria - 23 Jan 2018 2:53 PM     TASK REPLIED TO: Previously Assigned To Isamar Kearney [address]  to [place] & she said that  Brittnee Flowers has been all her meds as directed     Nick Quintero - 23 Jan 2018 5:33 PM     TASK REPLIED TO: Previously Assigned To Nick Quintero  New dose 137 mcg put in to take one daily   Yodit Henry - 23 Jan 2018 5:45 PM     TASK REPLIED TO: Previously Assigned To [facility] for callback   Lizzy Sams - 23 Jan 2018 5:49 PM     TASK REASSIGNED: Previously Assigned To [address] - 23 Jan 2018 5:59 PM     TASK REASSIGNED: Previously Assigned To Pilar  - 23 Jan 2018 6:00 PM     TASK REPLIED TO: Previously Assigned To [facility]   Electronically signed by : Juan Yates MD; Jan 23 2018  6:27PM EST                       (Co-author)

## 2018-01-24 NOTE — CONSULTS
Chief Complaint  3 month f/u      History of Present Illness  followup for CHF    Recent weight gain and modification of diuretics  Weight has slowly come down  She has no dyspnea at rest, no orthopnea  She has significant LE edema  Review of Systems      Cardiac: rhythm problems, has heart murmur present and has swelling in the     Skin: No complaints of nonhealing sores or skin rash  Genitourinary: No complaints of recurrent urinary tract infections, frequent urination at night, difficult urination, blood in urine, kidney stones, loss of bladder control, kidney problems, denies any birth control or hormone replacement, is not post menopausal, not currently pregnant  Psychological: No complaints of feeling depressed, anxiety, panic attacks, or difficulty concentrating  General: lack of energy/fatigue  Respiratory: shortness of breath  HEENT: No complaints of serious problems, hearing problems, nose problems, throat problems, or snoring  Gastrointestinal: No complaints of liver problems, nausea, vomiting, heartburn, constipation, bloody stools, diarrhea, problems swallowing, adbominal pain, or rectal bleeding  Hematologic: No complaints of bleeding disorders, anemia, blood clots, or excessive brusing  Neurological: No complaints of numbness, tingling, dizziness, weakness, seizures, headaches, syncope or fainting, AM fatigue, daytime sleepiness, no witnessed apnea episodes  Musculoskeletal: arthritis     ROS reviewed  Active Problems    1  Actinic keratosis (702 0) (L57 0)   2  Age-related cognitive decline (294 9) (R41 81)   3  Anxiety (300 00) (F41 9)   4  Bacterial pneumonia (482 9) (J15 9)   5  Benign paroxysmal positional vertigo (386 11) (H81 10)   6  Blister of leg (916 2) (S80 829A)   7  Breast cancer (174 9) (C50 919)   8  Chronic diastolic CHF (congestive heart failure) (428 32,428 0) (I50 32)   9  Compression fracture of thoracic vertebra, sequela (905 1) (S22 000S)   10  Depression with anxiety (300 4) (F41 8)   11  Dizziness (780 4) (R42)   12  Feeling weak (780 79) (R53 1)   13  Flu vaccine need (V04 81) (Z23)   14  Glaucoma (365 9) (H40 9)   15  Hyperglycemia (790 29) (R73 9)   16  Hyperlipidemia (272 4) (E78 5)   17  Hypertension (401 9) (I10)   18  Hypothyroidism (244 9) (E03 9)   19  Insomnia (780 52) (G47 00)   20  Lower leg edema (782 3) (R60 0)   21  PAF (paroxysmal atrial fibrillation) (427 31) (I48 0)   22  Postural dizziness with presyncope (780 4,780 2) (R42,R55)   23  Screening for genitourinary condition (V81 6) (Z13 89)   24  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)   25  Thoracic back pain (724 1) (M54 6)   26  Thoracic neuralgia (729 2) (M79 2)   27  Tinea pedis (110 4) (B35 3)   28  Tinea pedis of right foot (110 4) (B35 3)   29  Transient ischemic attack (TIA) (435 9) (G45 9)   30  Venous ulcer of right leg (454 0) (I83 019)   31  Vitamin D deficiency (268 9) (E55 9)    Past Medical History    · Denied: History of Alcohol abuse   · 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)   · History of athlete's foot (V12 09) (Z86 19)   · History of breast cancer (V10 3) (Z85 3)   · History of pneumothorax (V12 69) (Z87 09)   · Denied: History of substance abuse   · Hypertension (401 9) (I10)   · History of Tuberculosis (V12 01)   · History of URTI (acute upper respiratory infection) (465 9) (J06 9)    The active problems and past medical history were reviewed and updated today  Surgical History    · History of Breast Surgery Mastectomy   · History of Cataract Surgery   · History of [place] Lymph Node Biopsy   · History of Total Abdominal Hysterectomy With Removal Of Both Ovaries    The surgical history was reviewed and updated today         Family History    · Family history of Breast Cancer (V16 3)    · Family history of Adenocarcinoma Of Large Intestine (V16 0)    · Family history of Gastric Cancer (V16 0) · Denied: Family history of Alcohol abuse   · Family history of Cancer   · Family history of malignant neoplasm (V16 9) (Z80 9)   · Family history of stroke (V17 1) (Z82 3)   · Denied: Family history of substance abuse   · Family history of thyroid disease (V18 19) (Z83 49)   · Denied: Family history of Mental health problem    The family history was reviewed and updated today  Social History    · Always uses seat belt   · Marital History - Currently    · Never a smoker   · Never Drank Alcohol  The social history was reviewed and updated today  Current Meds   1  [facility] Vitamin C 500 MG TABS; Therapy: (Recorded:08Amf1638) to Recorded   2  Atorvastatin Calcium 40 MG Oral Tablet; TAKE ONE TABLET BY MOUTH ONCE DAILY; Therapy: 04DKL6580 to (Felix Wei)  Requested for: 02FWI3207; Last   Rx:09Mar2017 Ordered   3  Calcium 600 MG Oral Tablet; Doesn't take during the summer time; Therapy: 52UMQ3377 to Recorded   4  DilTIAZem HCl - 120 MG Oral Tablet; Therapy: 44STI4460 to  Requested for: 99JBT4379 Recorded   5  Eliquis 2 5 MG Oral Tablet; Take 1 tablet twice daily; Therapy: 16MBC8255 to (Felix Wei)  Requested for: 06Izt2237; Last   Rx:33Vdv0901 Ordered   6  Furosemide 40 MG Oral Tablet; Take 1 tablet twice daily; Therapy: 50QDX3199 to (Kimberly Street)  Requested for: 17XOH9301; Last   Rx:06Jan2018 Ordered   7  Levothyroxine Sodium 125 MCG Oral Tablet; Take One tablet by mouth in the morning; Therapy: 12KXN4469 to (Diomedes Minor)  Requested for: 21Jun2017 Ordered   8  Metoprolol Succinate ER 25 MG Oral Tablet Extended Release 24 Hour; TAKE 0 5   TABLET Every twelve hours; Therapy: 07Apr2017 to (Evaluate:04Jun2018)  Requested for: 04INK0191; Last   Rx:06Nov2017 Ordered   9  Nystatin-Triamcinolone 097169-1 1 UNIT/GM-% External Cream; APPLY SPARINGLY TO   AFFECTED AREA(S) 3 TIMES A DAY;    Therapy: 10Hzn7255 to (05 12 73 93 30)  Requested for: 66Mhz3582; Last Rx: 51PWV9775 Ordered   10  Potassium Chloride ER 20 MEQ Oral Tablet Extended Release; Take 1 tablet daily; Therapy: 07Apr2017 to (Evaluate:24Mar2018)  Requested for: 54MKN8953; Last    Rx:18Koc4825 Ordered   11  Sertraline HCl - 25 MG Oral Tablet; take one tablet by mouth every day; Therapy: 36WXN6888 to (Evalene Red)  Requested for: 13DTM8368; Last    Rx:08Jan2018 Ordered   12  Vitamin D TABS; Therapy: ([phone]) to Recorded    The medication list was reviewed and updated today  Allergies    1  Corticosteroids   2  Levaquin TABS   3  Lyrica CAPS    Vitals   Recorded: 10EKG5458 12:55PM   Heart Rate 90   Systolic 90   Diastolic 62   Weight 023 lb    BMI Calculated 22 96   BSA Calculated 1 69     Physical Exam    Constitutional   General appearance: No acute distress, well appearing and well nourished  Eyes   Conjunctiva and Sclera examination: Conjunctiva pink, sclera anicteric  Ears, Nose, Mouth, and Throat - Oropharynx: Clear, nares are clear, mucous membranes are moist    Neck   Neck and thyroid: Normal, supple, trachea midline, no thyromegaly  Pulmonary   Respiratory effort: No increased work of breathing or signs of respiratory distress  Auscultation of lungs: Clear to auscultation, no rales, no rhonchi, no wheezing, good air movement  Cardiovascular   Auscultation of heart: Normal rate and rhythm, normal S1 and S2, no murmurs  Carotid pulses: Normal, 2+ bilaterally  Peripheral vascular exam: Normal pulses throughout, no tenderness, erythema or swelling  Pedal pulses: Normal, 2+ bilaterally  Examination of extremities for edema and/or varicosities: Normal     Abdomen   Abdomen: Non-tender and no distention  Liver and spleen: No hepatomegaly or splenomegaly  Musculoskeletal Gait and station: Normal gait  Digits and nails: Normal without clubbing or cyanosis   Inspection/palpation of joints, bones, and muscles: Normal, ROM normal     Skin - Skin and subcutaneous tissue: Normal without rashes or lesions  Skin is warm and well perfused, normal turgor  Neurologic - Cranial nerves: II - XII intact  Speech: Normal     Psychiatric - Orientation to person, place, and time: Normal  Mood and affect: Normal       Assessment    1  Chronic diastolic CHF (congestive heart failure) (428 32,428 0) (I50 32)   2  PAF (paroxysmal atrial fibrillation) (427 31) (I48 0)    Plan  Chronic diastolic CHF (congestive heart failure)    · MetOLazone 2 5 MG Oral Tablet; take 1 tablet daily prn   Rx By: Yamel Whitaker; Dispense: 0 Days ; #:15 Tablet; Refill: 4; For: Chronic diastolic CHF (congestive heart failure); GEMMA = N; Verified Transmission to [facility]; Last Updated By: System, SureScripts; 1/22/2018 1:30:23 PM  PAF (paroxysmal atrial fibrillation)    · (1) BASIC METABOLIC PROFILE; Status:Active; Requested for:22Jan2018; Perform:[facility]; ZMF:30CPA2879;REQVOHE; For:PAF (paroxysmal atrial fibrillation); Ordered By:Anaya Sahu;   · Follow-up visit in 3 months Evaluation and Treatment  Follow-up  Status: Complete   Done: 22Apr2018   Ordered; For: PAF (paroxysmal atrial fibrillation); Ordered By: Yamel Whitaker Performed:  Due: 17OCE2409; Last Updated By: Jadyn Rivas; 1/22/2018 1:28:03 PM    Discussion/Summary    Paroxysmal Atrial Fibrillation: She is in atrial fibrillation today  Continue Metoprolol and Eliquis  Chronic Diastolic CHF with elevated left sided filling pressures and pulmonary HTN  She appears to be carrying about ten pounds of extra volume  She will take as needed metolazone  She will take one metolazone tomorrow thirty minutes prior to her lasix dose  May end up taking metolazone once a week but will coordinate this with visiting nursing  HTN: BP stable on medical on medical therapy  The patient was counseled regarding diagnostic results, instructions for management, risk factor reductions, impressions   total time of encounter was 25 minutes and 15 minutes was spent counseling        Future Appointments    Signatures   Electronically signed by : SANFORD Sol ; Jan 22 2018  6:58PM EST                       (Author)

## 2018-03-01 DIAGNOSIS — E78.2 MIXED HYPERLIPIDEMIA: Primary | ICD-10-CM

## 2018-03-02 RX ORDER — ATORVASTATIN CALCIUM 40 MG/1
TABLET, FILM COATED ORAL
Qty: 90 TABLET | Refills: 3 | Status: SHIPPED | OUTPATIENT
Start: 2018-03-02 | End: 2018-06-21 | Stop reason: HOSPADM

## 2018-03-05 DIAGNOSIS — I48.91 ATRIAL FIBRILLATION, UNSPECIFIED TYPE (HCC): Primary | ICD-10-CM

## 2018-03-05 RX ORDER — APIXABAN 2.5 MG/1
2.5 TABLET, FILM COATED ORAL 2 TIMES DAILY
Qty: 90 TABLET | Refills: 3 | Status: SHIPPED | OUTPATIENT
Start: 2018-03-05 | End: 2018-05-18 | Stop reason: SDUPTHER

## 2018-03-05 RX ORDER — APIXABAN 2.5 MG/1
TABLET, FILM COATED ORAL
COMMUNITY
Start: 2018-01-29 | End: 2018-03-05 | Stop reason: SDUPTHER

## 2018-03-20 ENCOUNTER — TELEPHONE (OUTPATIENT)
Dept: CARDIOLOGY CLINIC | Facility: CLINIC | Age: 83
End: 2018-03-20

## 2018-03-20 ENCOUNTER — TELEPHONE (OUTPATIENT)
Dept: FAMILY MEDICINE CLINIC | Facility: HOSPITAL | Age: 83
End: 2018-03-20

## 2018-03-20 NOTE — TELEPHONE ENCOUNTER
Genny Corona, Home Care Nurse, called to inform that she is discharging patient from [facility] as of today  Patient's weight is stable at 121 lbs  Which is down 20 lbs from the start of care  Patient's family was instructed that if patient's weight was 125 lbs or higher that they are to call the office for further instruction  Patient continues to have Chronic rails and +2 edema in lower extremities which is decreased from +4

## 2018-03-20 NOTE — TELEPHONE ENCOUNTER
Home care nurse calling to let us know that Jessa Rodriguez is being discharged from home care  Her weight is down from 140 to 121lb  Was instructed to call the cardiologist if her weight goes above 125 because she would need to start back on lasix  Edema is down from +4 to +2 in lower extremities  Continues to have rails in her lung bases  Ulcers on her legs are now scabs which are being monitored by Dr Laura Go in home every 6 weeks

## 2018-03-26 DIAGNOSIS — I50.32 CHRONIC DIASTOLIC CONGESTIVE HEART FAILURE (HCC): Primary | ICD-10-CM

## 2018-03-26 RX ORDER — POTASSIUM CHLORIDE 1500 MG/1
1 TABLET, FILM COATED, EXTENDED RELEASE ORAL DAILY
COMMUNITY
Start: 2017-04-07 | End: 2018-03-26 | Stop reason: SDUPTHER

## 2018-03-27 RX ORDER — POTASSIUM CHLORIDE 1500 MG/1
1 TABLET, FILM COATED, EXTENDED RELEASE ORAL DAILY
Qty: 90 TABLET | Refills: 3 | Status: SHIPPED | OUTPATIENT
Start: 2018-03-27 | End: 2018-05-18 | Stop reason: SDUPTHER

## 2018-03-28 RX ORDER — SERTRALINE HYDROCHLORIDE 25 MG/1
TABLET, FILM COATED ORAL
COMMUNITY
Start: 2018-02-05 | End: 2018-04-06 | Stop reason: SDUPTHER

## 2018-03-28 RX ORDER — METOPROLOL SUCCINATE 25 MG/1
TABLET, EXTENDED RELEASE ORAL
COMMUNITY
Start: 2018-02-05 | End: 2018-05-18 | Stop reason: SDUPTHER

## 2018-03-28 RX ORDER — FUROSEMIDE 40 MG/1
TABLET ORAL
COMMUNITY
Start: 2018-02-24 | End: 2018-05-18 | Stop reason: SDUPTHER

## 2018-03-28 RX ORDER — METOLAZONE 2.5 MG/1
TABLET ORAL
COMMUNITY
Start: 2018-01-22 | End: 2018-05-18 | Stop reason: SDUPTHER

## 2018-03-28 RX ORDER — COLLAGENASE SANTYL 250 [ARB'U]/G
OINTMENT TOPICAL
Refills: 1 | COMMUNITY
Start: 2018-01-05 | End: 2018-06-18

## 2018-03-28 RX ORDER — ASCORBIC ACID 500 MG
TABLET ORAL
COMMUNITY
End: 2018-06-18

## 2018-03-28 RX ORDER — CEPHALEXIN 500 MG/1
CAPSULE ORAL
COMMUNITY
Start: 2018-01-05 | End: 2018-06-18

## 2018-03-31 PROBLEM — R60.0 LOWER LEG EDEMA: Status: ACTIVE | Noted: 2017-04-17

## 2018-03-31 PROBLEM — I48.0 PAF (PAROXYSMAL ATRIAL FIBRILLATION) (HCC): Status: ACTIVE | Noted: 2017-03-16

## 2018-03-31 PROBLEM — B35.3 TINEA PEDIS OF RIGHT FOOT: Status: ACTIVE | Noted: 2017-08-25

## 2018-03-31 PROBLEM — I83.019 VENOUS ULCER OF RIGHT LEG (HCC): Status: ACTIVE | Noted: 2017-12-29

## 2018-03-31 PROBLEM — S22.000S COMPRESSION FRACTURE OF THORACIC VERTEBRA, SEQUELA: Status: ACTIVE | Noted: 2017-03-01

## 2018-03-31 PROBLEM — F41.8 DEPRESSION WITH ANXIETY: Status: ACTIVE | Noted: 2017-10-13

## 2018-03-31 PROBLEM — R42 POSTURAL DIZZINESS WITH PRESYNCOPE: Status: ACTIVE | Noted: 2017-09-08

## 2018-03-31 PROBLEM — R55 POSTURAL DIZZINESS WITH PRESYNCOPE: Status: ACTIVE | Noted: 2017-09-08

## 2018-03-31 PROBLEM — L97.919 VENOUS ULCER OF RIGHT LEG (HCC): Status: ACTIVE | Noted: 2017-12-29

## 2018-03-31 PROBLEM — M79.2 THORACIC NEURALGIA: Status: ACTIVE | Noted: 2017-05-31

## 2018-04-02 ENCOUNTER — OFFICE VISIT (OUTPATIENT)
Dept: FAMILY MEDICINE CLINIC | Facility: HOSPITAL | Age: 83
End: 2018-04-02
Payer: COMMERCIAL

## 2018-04-02 ENCOUNTER — OFFICE VISIT (OUTPATIENT)
Dept: CARDIOLOGY CLINIC | Facility: CLINIC | Age: 83
End: 2018-04-02
Payer: COMMERCIAL

## 2018-04-02 VITALS
HEIGHT: 62 IN | BODY MASS INDEX: 22.26 KG/M2 | DIASTOLIC BLOOD PRESSURE: 60 MMHG | HEART RATE: 56 BPM | WEIGHT: 121 LBS | SYSTOLIC BLOOD PRESSURE: 100 MMHG

## 2018-04-02 VITALS
HEART RATE: 84 BPM | SYSTOLIC BLOOD PRESSURE: 100 MMHG | HEIGHT: 62 IN | WEIGHT: 117.8 LBS | BODY MASS INDEX: 21.68 KG/M2 | TEMPERATURE: 98.7 F | DIASTOLIC BLOOD PRESSURE: 60 MMHG

## 2018-04-02 DIAGNOSIS — E87.1 HYPONATREMIA: ICD-10-CM

## 2018-04-02 DIAGNOSIS — R41.81 AGE-RELATED COGNITIVE DECLINE: Primary | ICD-10-CM

## 2018-04-02 DIAGNOSIS — I50.30 DIASTOLIC CONGESTIVE HEART FAILURE, UNSPECIFIED CONGESTIVE HEART FAILURE CHRONICITY: Primary | ICD-10-CM

## 2018-04-02 DIAGNOSIS — R60.0 LOWER LEG EDEMA: ICD-10-CM

## 2018-04-02 DIAGNOSIS — E11.65 TYPE 2 DIABETES MELLITUS WITH HYPERGLYCEMIA, WITHOUT LONG-TERM CURRENT USE OF INSULIN (HCC): ICD-10-CM

## 2018-04-02 DIAGNOSIS — E78.2 MIXED HYPERLIPIDEMIA: ICD-10-CM

## 2018-04-02 DIAGNOSIS — E03.9 ACQUIRED HYPOTHYROIDISM: ICD-10-CM

## 2018-04-02 DIAGNOSIS — I48.91 ATRIAL FIBRILLATION WITH RVR (HCC): ICD-10-CM

## 2018-04-02 PROCEDURE — 99214 OFFICE O/P EST MOD 30 MIN: CPT | Performed by: INTERNAL MEDICINE

## 2018-04-02 PROCEDURE — 99214 OFFICE O/P EST MOD 30 MIN: CPT | Performed by: FAMILY MEDICINE

## 2018-04-02 NOTE — PROGRESS NOTES
Cardiology Follow Up    Brittany Wynn  8/17/1927  [id number]  HEART & VASCULAR  [facility] [address] [facility] Theta Dessert  [address]  [address] [address] [address]    No diagnosis found  Interval History: Followup for CHF  Doing relatively well  No chest pain, no dyspnea, and no palpitations  Tolerating medical therapy       Problem List     Transient ischemic attack (TIA)    Benign paroxysmal positional vertigo    Hyperlipidemia    Glaucoma    Hypothyroidism    Dementia    Breast cancer ([address] )    Overview Signed 3/31/2018 10:03 AM by Wendy Montejo MD     Transitioned From: Carcinoma in situ of breast; Description: right         Chronic pain    Diabetes mellitus ([address] )    Atrial fibrillation with RVR (new onset)    Hyponatremia    Constipation    Compression fracture of thoracic vertebra, sequela    Hypokalemia    Chronic diastolic CHF (congestive heart failure) (HCC)    Hypertension    Acute metabolic encephalopathy    Actinic keratosis    Age-related cognitive decline    Anxiety    Vitamin D deficiency    Venous ulcer of right leg (HCC)    Tinea pedis of right foot    Thoracic neuralgia    Thoracic back pain    Spondylosis of lumbar region without myelopathy or radiculopathy    Postural dizziness with presyncope    PAF (paroxysmal atrial fibrillation) (HCC)    Lower leg edema    Insomnia    Hyperglycemia    Depression with anxiety        Past Medical History:   Diagnosis Date    Athlete's foot     last assessed 5/9/2012    Benign neoplasm of large intestine     Breast CA ([address] )     Chronic pain     Dementia     Diabetes mellitus ([address] )     Disease of thyroid gland     Glaucoma     Hyperlipidemia     Hypertension     last assessed 9/25/2017    Pneumothorax     TIA (transient ischemic attack)     Tuberculosis     Vertigo, benign paroxysmal      Social History     Social History    Marital status: /Civil Union     Spouse name: N/A    Number of children: N/A  Years of education: N/A     Occupational History    Not on file       Social History Main Topics    Smoking status: Never Smoker    Smokeless tobacco: Not on file    Alcohol use No    Drug use: No    Sexual activity: Not on file     Other Topics Concern    Not on file     Social History Narrative    Always uses seat belt          Family History   Problem Relation Age of Onset    Heart disease Mother     Breast cancer Mother [de-identified]    Heart disease Father     Cancer Father      intestinal, unknown type    Cancer Sister 79     gastric    Cancer Brother     Cancer Brother     Cancer Family     Stroke Family     Thyroid disease Family      Past Surgical History:   Procedure Laterality Date    BREAST SURGERY      bilatral     CATARACT EXTRACTION Left     MASTECTOMY Bilateral     TOTAL ABDOMINAL HYSTERECTOMY W/ BILATERAL SALPINGOOPHORECTOMY  1973       Current Outpatient Prescriptions:     Ascorbic Acid (VITAMIN C) 500 MG CAPS, Take 500 mg by mouth, Disp: , Rfl:     ascorbic acid (VITAMIN C) 500 MG tablet, Take by mouth, Disp: , Rfl:     atorvastatin (LIPITOR) 40 mg tablet, TAKE ONE TABLET BY MOUTH ONCE DAILY, Disp: 90 tablet, Rfl: 3    brimonidine-timolol (COMBIGAN) 0 2-0 5 %, Administer 1 drop to both eyes every 12 (twelve) hours, Disp: , Rfl:     Calcium Carbonate-Vitamin D 600-400 MG-UNIT per chew tablet, Chew 1 tablet daily, Disp: , Rfl:     cephalexin (KEFLEX) 500 mg capsule, , Disp: , Rfl:     cholecalciferol (VITAMIN D3) 1,000 units tablet, Take by mouth, Disp: , Rfl:     ELIQUIS 2 5 MG, Take 1 tablet (2 5 mg total) by mouth 2 (two) times a day, Disp: 90 tablet, Rfl: 3    furosemide (LASIX) 40 mg tablet, , Disp: , Rfl:     lansoprazole (PREVACID) 30 mg capsule, Take 30 mg by mouth daily, Disp: , Rfl:     latanoprost (XALATAN) 0 005 % ophthalmic solution, Apply to eye, Disp: , Rfl:     levothyroxine 25 mcg/mL SUSP, Take by mouth, Disp: , Rfl:     metolazone (ZAROXOLYN) 2 5 mg tablet, , Disp: , Rfl:     nystatin-triamcinolone (MYCOLOG-II) cream, Apply topically 3 (three) times a day, Disp: , Rfl:     Potassium Chloride ER 20 MEQ TBCR, Take 1 tablet (20 mEq total) by mouth daily, Disp: 90 tablet, Rfl: 3    SANTYL ointment, APPLY EVERY OTHER DAY TO right leg wound, Disp: , Rfl: 1    sertraline (ZOLOFT) 25 mg tablet, , Disp: , Rfl:     valsartan (DIOVAN) 80 mg tablet, Take 1 tablet by mouth daily for 30 days, Disp: 30 tablet, Rfl: 0    aspirin 325 mg tablet, Take 1 tablet by mouth daily for 30 days, Disp: 30 tablet, Rfl: 0    diltiazem (CARDIZEM CD) 120 mg 24 hr capsule, Take 1 capsule by mouth daily for 30 days, Disp: 30 capsule, Rfl: 0    labetalol (NORMODYNE) 100 mg tablet, Take 1 tablet by mouth every 12 (twelve) hours for 30 days, Disp: 60 tablet, Rfl: 0    lidocaine (LIDODERM) 5 %, Place 1 patch on the skin daily for 30 days Remove & Discard patch within 12 hours or as directed by MD, Disp: 30 patch, Rfl: 0    metoprolol succinate (TOPROL-XL) 25 mg 24 hr tablet, , Disp: , Rfl:     QUEtiapine (SEROquel) 25 mg tablet, Take 1 tablet by mouth 2 (two) times a day for 30 days, Disp: 60 tablet, Rfl: 0  Allergies   Allergen Reactions    Corticosteroids      Annotation - [id number]: History of TB patient must avoid  Unknown reaction  Pt has history of TB         Levofloxacin     Other      Other reaction(s): no steroids due to TB hx    Pregabalin Dizziness       Labs:     Chemistry        Component Value Date/Time     06/13/2017 0813    K 4 0 06/13/2017 0813    CL 99 06/13/2017 0813    CO2 28 06/13/2017 0813    BUN 22 06/13/2017 0813    CREATININE 1 13 (H) 06/13/2017 0813        Component Value Date/Time    CALCIUM 9 5 06/13/2017 0813    ALKPHOS 74 06/13/2017 0813    AST 34 06/13/2017 0813    ALT 29 06/13/2017 0813    BILITOT 0 6 06/13/2017 0813            Lab Results   Component Value Date    CHOL 133 06/13/2017    CHOL 135 06/14/2016    CHOL 142 12/21/2014     Lab Results Component Value Date    HDL 86 06/13/2017    HDL 81 06/14/2016    HDL 58 12/21/2014     Lab Results   Component Value Date    LDLCALC 36 06/13/2017    LDLCALC 44 06/14/2016    LDLCALC 70 12/21/2014     Lab Results   Component Value Date    TRIG 57 06/13/2017    TRIG 52 06/14/2016    TRIG 71 12/21/2014     No components found for: CHOLHDL    Imaging: No results found  Review of Systems   Constitution: Negative  HENT: Negative  Eyes: Negative  Cardiovascular: Negative  Respiratory: Negative  Endocrine: Negative  Hematologic/Lymphatic: Negative  Skin: Negative  Musculoskeletal: Negative  Gastrointestinal: Negative  Genitourinary: Negative  Neurological: Negative  Psychiatric/Behavioral: Negative  Vitals:    04/02/18 1125   BP: 100/60   Pulse: 56           Physical Exam   Constitutional: She is oriented to person, place, and time  No distress  HENT:   Mouth/Throat: No oropharyngeal exudate  Eyes: No scleral icterus  Neck: No JVD present  Cardiovascular: Normal rate  An irregularly irregular rhythm present  Pulmonary/Chest: Effort normal and breath sounds normal  No respiratory distress  She has no wheezes  She has no rales  Abdominal: Soft  Bowel sounds are normal  She exhibits no distension  There is no tenderness  There is no rebound  Musculoskeletal: She exhibits no edema  Neurological: She is alert and oriented to person, place, and time  Skin: Skin is warm and dry  She is not diaphoretic  Psychiatric: She has a normal mood and affect  Her behavior is normal        Discussion/Summary:    Chronic Diastolic CHF: Her volume status has improved today and her weight is stable  She is due for [facility], but seeing Dr Corrie Joseph today who may want other labs, so will default to him  Atrial Fibrillation: Stable on Metoprolol and eliquis         The patient was counseled regarding diagnostic results, instructions for [facility] reductions, impressions  total time of encounter was 25 minutes and 15 minutes was spent counseling

## 2018-04-02 NOTE — PROGRESS NOTES
Assessment/Plan:         Diagnoses and all orders for this visit:    Age-related cognitive decline  Comments:  Stable cognitive decline, under close auspices of her daughter  Continue Sertraline    Mixed hyperlipidemia  Comments:  Due for fastings  Orders:  -     CBC and differential; Future  -     Lipid Panel with Direct LDL reflex; Future  -     CBC and differential  -     Lipid Panel with Direct LDL reflex    Hyponatremia    Lower leg edema  Comments:  Stable edema    Atrial fibrillation with RVR (new onset)    Type 2 diabetes mellitus with hyperglycemia, without long-term current use of insulin (HCC)  Comments:  Recheck A1c  Orders:  -     Hemoglobin A1c; Future  -     Hemoglobin A1c    Acquired hypothyroidism  Comments:  Euthyroid clinically, needing recheck of TSH  Orders:  -     Comprehensive metabolic panel; Future  -     TSH, 3rd generation; Future  -     Comprehensive metabolic panel  -     TSH, 3rd generation          Subjective:      Patient ID: Pawan Singh is a 80 y o  female  Weight is holding pretty steady  Seen today by Dr Nasim Maciel, addressing weight fluctuations  Appetite is pretty good  No medication changes   No recent illness or injury, she does have a walker at home   Wearing compression stockings  LE ulcers doing well, minimally persistent  The following portions of the patient's history were reviewed and updated as appropriate: allergies, current medications, past family history, past medical history, past social history, past surgical history and problem list     Review of Systems   Constitutional: Negative for appetite change, fatigue, fever and unexpected weight change  HENT: Negative for congestion  Eyes: Negative for pain  Respiratory: Negative for cough, choking and shortness of breath  Cardiovascular: Positive for leg swelling  Negative for chest pain and palpitations  Gastrointestinal: Negative for abdominal pain, constipation and diarrhea     Genitourinary: Negative for difficulty urinating  Neurological: Negative for headaches  Hematological: Negative for adenopathy  Does not bruise/bleed easily  Psychiatric/Behavioral: Positive for dysphoric mood  Negative for agitation and sleep disturbance  The patient is nervous/anxious  Objective:      /60 (BP Location: Left arm, Patient Position: Sitting, Cuff Size: Standard)   Pulse 84   Temp 98 7 °F (37 1 °C) (Tympanic)   Ht 5' 2" (1 575 m)   Wt 53 4 kg (117 lb 12 8 oz)   LMP  (LMP Unknown)   BMI 21 55 kg/m²          Physical Exam   Constitutional: She is oriented to person, place, and time  She appears well-nourished  Eyes: Conjunctivae are normal    Neck: Neck supple  Cardiovascular: Normal rate, regular rhythm, normal heart sounds and intact distal pulses  Musculoskeletal: She exhibits edema  Neurological: She is alert and oriented to person, place, and time  Psychiatric: She has a normal mood and affect   Her behavior is normal

## 2018-04-06 DIAGNOSIS — F32.A DEPRESSION, UNSPECIFIED DEPRESSION TYPE: Primary | ICD-10-CM

## 2018-04-06 RX ORDER — SERTRALINE HYDROCHLORIDE 25 MG/1
TABLET, FILM COATED ORAL
Qty: 30 TABLET | Refills: 2 | Status: SHIPPED | OUTPATIENT
Start: 2018-04-06 | End: 2018-05-18 | Stop reason: SDUPTHER

## 2018-04-09 ENCOUNTER — TELEPHONE (OUTPATIENT)
Dept: CARDIOLOGY CLINIC | Facility: CLINIC | Age: 83
End: 2018-04-09

## 2018-04-09 DIAGNOSIS — I10 ESSENTIAL HYPERTENSION: Primary | ICD-10-CM

## 2018-04-09 RX ORDER — DILTIAZEM HYDROCHLORIDE 120 MG/1
CAPSULE, EXTENDED RELEASE ORAL
Qty: 30 CAPSULE | Refills: 6 | Status: SHIPPED | OUTPATIENT
Start: 2018-04-09 | End: 2018-05-18 | Stop reason: SDUPTHER

## 2018-04-09 NOTE — TELEPHONE ENCOUNTER
Patient's daughter Leonor Gillette called stating patient needs a refill on her Diltiazem 24 hour capsule 120 mg daily  This is not listed as one of her medications but was previously  I do not see that it was d/c as per your notes  But it does look like it was d/c as per her last visit  Patient would like a refill for 90 days sent to [facility] DR LIZABETH CYR in [facility]        Please Advise

## 2018-05-17 ENCOUNTER — OFFICE VISIT (OUTPATIENT)
Dept: SURGICAL ONCOLOGY | Facility: HOSPITAL | Age: 83
End: 2018-05-17
Payer: COMMERCIAL

## 2018-05-17 VITALS
HEART RATE: 113 BPM | HEIGHT: 62 IN | SYSTOLIC BLOOD PRESSURE: 118 MMHG | TEMPERATURE: 96.7 F | WEIGHT: 110 LBS | BODY MASS INDEX: 20.24 KG/M2 | RESPIRATION RATE: 18 BRPM | DIASTOLIC BLOOD PRESSURE: 80 MMHG

## 2018-05-17 DIAGNOSIS — C50.911 MALIGNANT NEOPLASM OF RIGHT BREAST IN FEMALE, ESTROGEN RECEPTOR POSITIVE, UNSPECIFIED SITE OF BREAST (HCC): Primary | ICD-10-CM

## 2018-05-17 DIAGNOSIS — Z17.0 MALIGNANT NEOPLASM OF RIGHT BREAST IN FEMALE, ESTROGEN RECEPTOR POSITIVE, UNSPECIFIED SITE OF BREAST (HCC): Primary | ICD-10-CM

## 2018-05-17 PROCEDURE — 99214 OFFICE O/P EST MOD 30 MIN: CPT | Performed by: SURGERY

## 2018-05-17 PROCEDURE — 4040F PNEUMOC VAC/ADMIN/RCVD: CPT | Performed by: SURGERY

## 2018-05-17 RX ORDER — LEVOTHYROXINE SODIUM 137 UG/1
TABLET ORAL
COMMUNITY
Start: 2018-04-18 | End: 2018-05-18 | Stop reason: SDUPTHER

## 2018-05-17 NOTE — PROGRESS NOTES
Surgical Oncology Follow Up       [facility] SURGICAL ONCOLOGY Mike Barragan  [facility] [address]  8/17/1927  [id number]  [address]  [facility] [address]  [place]  [address] [address]    Chief Complaint   Patient presents with    Breast Cancer     Pt is here for 1 year follow up        Assessment/Plan   Diagnoses and all orders for this visit:    Malignant neoplasm of right breast in female, estrogen receptor positive, unspecified site of breast ([facility] [address] )    Other orders  -     levothyroxine 137 mcg tablet; Advance Care Planning/Advance Directives:  Did not discuss  with the patient  Oncology History:       Breast cancer [facility])     Initial Diagnosis     Breast cancer ([facility] [address] )       7/16/2012 Surgery     Right breast US guided core biopsy IDC         8/16/2012 Surgery     Right mastectomy, SLNB          Hormone Therapy     Pt declined hormone therapy            History of Present Illness: breast cancer follow up  -Interval History:none    Review of Systems:  Review of Systems   Constitutional: Positive for appetite change (no appetite), fatigue and unexpected weight change (continued weight loss)  Negative for fever  Eyes: Negative  Respiratory: Positive for shortness of breath (with ambulation)  Cardiovascular: Positive for palpitations  Gastrointestinal: Negative  Endocrine: Negative  Genitourinary: Negative  Musculoskeletal: Negative  Negative for arthralgias and myalgias  Skin: Negative  Allergic/Immunologic: Negative  Neurological: Negative  Hematological: Negative  Negative for adenopathy  Does not bruise/bleed easily  Psychiatric/Behavioral: Negative          Patient Active Problem List   Diagnosis    Transient ischemic attack (TIA)    Benign paroxysmal positional vertigo    Hyperlipidemia    Glaucoma  Hypothyroidism    Dementia    Breast cancer ([facility] [address] )    Chronic pain    Diabetes mellitus (HCC)    Atrial fibrillation with RVR (new onset)    Hyponatremia    Constipation    Compression fracture of thoracic vertebra, sequela    Hypokalemia    Chronic diastolic CHF (congestive heart failure) (HCC)    Hypertension    Acute metabolic encephalopathy    Actinic keratosis    Age-related cognitive decline    Anxiety    Vitamin D deficiency    Venous ulcer of right leg (HCC)    Tinea pedis of right foot    Thoracic neuralgia    Thoracic back pain    Spondylosis of lumbar region without myelopathy or radiculopathy    Postural dizziness with presyncope    PAF (paroxysmal atrial fibrillation) (HCC)    Lower leg edema    Insomnia    Hyperglycemia    Depression with anxiety     Past Medical History:   Diagnosis Date    Actinic keratoses     Age-related cognitive decline     Anxiety     Athlete's foot     last assessed 5/9/2012    Benign neoplasm of large intestine     Breast CA (HCC)     CHF (congestive heart failure) (HCC)     Chronic pain     Compression fracture of thoracic vertebra, sequela     Dementia     Diabetes mellitus (HCC)     Disease of thyroid gland     Dizziness     Feeling weak     Glaucoma     Hyperglycemia     Hyperlipemia     Hyperlipidemia     Hypertension     last assessed 9/25/2017    Insomnia     Lower leg edema     Paroxysmal A-fib (HCC)     Pneumothorax     Thoracic back pain     TIA (transient ischemic attack)     Tinea pedis     Tuberculosis     Vertigo, benign paroxysmal     Vitamin D deficiency      Past Surgical History:   Procedure Laterality Date    BREAST BIOPSY Bilateral     SENTINEL LYMPH NODE BIOPSY    BREAST SURGERY Bilateral     MASTECTOMY     CATARACT EXTRACTION Left     HYSTERECTOMY      MASTECTOMY Bilateral     TOTAL ABDOMINAL HYSTERECTOMY W/ BILATERAL SALPINGOOPHORECTOMY  1973     Family History   Problem Relation Age of Onset    Heart disease Mother     Breast cancer Mother [de-identified]    Heart disease Father     Cancer Father      intestinal, unknown type    Cancer Sister 79     gastric    Cancer Brother     Alcohol abuse Brother     Cancer Brother     Alcohol abuse Brother     Cancer Family     Stroke Family     Thyroid disease Family      Social History     Social History    Marital status: /Civil Union     Spouse name: N/A    Number of children: N/A    Years of education: N/A     Occupational History    Not on file       Social History Main Topics    Smoking status: Never Smoker    Smokeless tobacco: Never Used    Alcohol use No    Drug use: No    Sexual activity: Not on file     Other Topics Concern    Not on file     Social History Narrative    Always uses seat belt           Current Outpatient Prescriptions:     Ascorbic Acid (VITAMIN C) 500 MG CAPS, Take 500 mg by mouth, Disp: , Rfl:     atorvastatin (LIPITOR) 40 mg tablet, TAKE ONE TABLET BY MOUTH ONCE DAILY, Disp: 90 tablet, Rfl: 3    brimonidine-timolol (COMBIGAN) 0 2-0 5 %, Administer 1 drop to both eyes every 12 (twelve) hours, Disp: , Rfl:     Calcium Carbonate-Vitamin D 600-400 MG-UNIT per chew tablet, Chew 1 tablet daily, Disp: , Rfl:     CARTIA  MG 24 hr capsule, TAKE ONE CAPSULE BY MOUTH ONCE DAILY, Disp: 30 capsule, Rfl: 6    cholecalciferol (VITAMIN D3) 1,000 units tablet, Take by mouth, Disp: , Rfl:     ELIQUIS 2 5 MG, Take 1 tablet (2 5 mg total) by mouth 2 (two) times a day, Disp: 90 tablet, Rfl: 3    furosemide (LASIX) 40 mg tablet, , Disp: , Rfl:     lansoprazole (PREVACID) 30 mg capsule, Take 30 mg by mouth daily, Disp: , Rfl:     latanoprost (XALATAN) 0 005 % ophthalmic solution, Apply to eye, Disp: , Rfl:     levothyroxine 137 mcg tablet, , Disp: , Rfl:     metoprolol succinate (TOPROL-XL) 25 mg 24 hr tablet, , Disp: , Rfl:     Potassium Chloride ER 20 MEQ TBCR, Take 1 tablet (20 mEq total) by mouth daily, Disp: 90 tablet, Rfl: 3    sertraline (ZOLOFT) 25 mg tablet, TAKE ONE TABLET BY MOUTH ONCE DAILY, Disp: 30 tablet, Rfl: 2    ascorbic acid (VITAMIN C) 500 MG tablet, Take by mouth, Disp: , Rfl:     cephalexin (KEFLEX) 500 mg capsule, , Disp: , Rfl:     levothyroxine 25 mcg/mL SUSP, Take by mouth, Disp: , Rfl:     lidocaine (LIDODERM) 5 %, Place 1 patch on the skin daily for 30 days Remove & Discard patch within 12 hours or as directed by MD, Disp: 30 patch, Rfl: 0    metolazone (ZAROXOLYN) 2 5 mg tablet, , Disp: , Rfl:     nystatin-triamcinolone (MYCOLOG-II) cream, Apply topically 3 (three) times a day, Disp: , Rfl:     QUEtiapine (SEROquel) 25 mg tablet, Take 1 tablet by mouth 2 (two) times a day for 30 days, Disp: 60 tablet, Rfl: 0    SANTYL ointment, APPLY EVERY OTHER DAY TO right leg wound, Disp: , Rfl: 1  Allergies   Allergen Reactions    Corticosteroids      Annotation - [id number]: History of TB patient must avoid  Unknown reaction  Pt has history of TB   Levofloxacin     Other      Other reaction(s): no steroids due to TB hx    Pregabalin Dizziness       The following portions of the patient's history were reviewed and updated as appropriate: allergies, current medications, past family history, past medical history, past social history, past surgical history and problem list         Vitals:    05/17/18 1403   BP: 118/80   Pulse: (!) 113   Resp: 18   Temp: (!) 96 7 °F (35 9 °C)       Physical Exam   Constitutional: She appears well-developed and well-nourished  HENT:   Head: Normocephalic and atraumatic  Cardiovascular:   Heart rate is fast   Pulmonary/Chest: Breath sounds normal  Right breast exhibits skin change (mastectomy scar)  Right breast exhibits no mass and no tenderness  Left breast exhibits skin change (mastectomy scar)  Left breast exhibits no mass and no tenderness  Abdominal: Soft  Lymphadenopathy:        Right axillary: No pectoral and no lateral adenopathy present          Left axillary: No pectoral and no lateral adenopathy present  Right: No supraclavicular adenopathy present  Left: No supraclavicular adenopathy present  Psychiatric: She has a normal mood and affect  Discussion/Summary:  51-year-old female who is status post bilateral mastectomy for a stage IIA carcinoma of the right breast and in situ carcinoma of the left breast   She had no additional therapy  She has been progressively losing weight with loss of appetite and overall does not feel well  She is accompanied by her daughter today  She is in the process of placing her in a nursing home  I will therefore see Dominic Calhoun on an as needed basis

## 2018-05-18 DIAGNOSIS — I10 ESSENTIAL HYPERTENSION: ICD-10-CM

## 2018-05-18 DIAGNOSIS — I50.32 CHRONIC DIASTOLIC CONGESTIVE HEART FAILURE (HCC): ICD-10-CM

## 2018-05-18 DIAGNOSIS — E03.9 ACQUIRED HYPOTHYROIDISM: ICD-10-CM

## 2018-05-18 DIAGNOSIS — R55 POSTURAL DIZZINESS WITH PRESYNCOPE: ICD-10-CM

## 2018-05-18 DIAGNOSIS — F02.80 LATE ONSET ALZHEIMER'S DISEASE WITHOUT BEHAVIORAL DISTURBANCE (HCC): ICD-10-CM

## 2018-05-18 DIAGNOSIS — G30.1 LATE ONSET ALZHEIMER'S DISEASE WITHOUT BEHAVIORAL DISTURBANCE (HCC): ICD-10-CM

## 2018-05-18 DIAGNOSIS — R42 POSTURAL DIZZINESS WITH PRESYNCOPE: ICD-10-CM

## 2018-05-18 DIAGNOSIS — I50.32 CHRONIC DIASTOLIC CHF (CONGESTIVE HEART FAILURE) (HCC): ICD-10-CM

## 2018-05-18 DIAGNOSIS — F32.A DEPRESSION, UNSPECIFIED DEPRESSION TYPE: ICD-10-CM

## 2018-05-18 DIAGNOSIS — I48.91 ATRIAL FIBRILLATION, UNSPECIFIED TYPE (HCC): ICD-10-CM

## 2018-05-18 DIAGNOSIS — R52 PAIN: Primary | ICD-10-CM

## 2018-05-18 RX ORDER — ACETAMINOPHEN 325 MG/1
650 TABLET ORAL EVERY 6 HOURS PRN
Qty: 30 TABLET | Refills: 0 | Status: SHIPPED | OUTPATIENT
Start: 2018-05-18

## 2018-05-18 RX ORDER — FUROSEMIDE 40 MG/1
40 TABLET ORAL 2 TIMES DAILY
Qty: 30 TABLET | Refills: 0 | Status: SHIPPED | OUTPATIENT
Start: 2018-05-18 | End: 2018-06-21 | Stop reason: HOSPADM

## 2018-05-18 RX ORDER — LEVOTHYROXINE SODIUM 137 UG/1
137 TABLET ORAL DAILY
Qty: 30 TABLET | Refills: 0 | Status: SHIPPED | OUTPATIENT
Start: 2018-05-18

## 2018-05-18 RX ORDER — METOLAZONE 2.5 MG/1
2.5 TABLET ORAL DAILY
Qty: 30 TABLET | Refills: 0 | Status: SHIPPED | OUTPATIENT
Start: 2018-05-18 | End: 2018-06-21 | Stop reason: HOSPADM

## 2018-05-18 RX ORDER — POTASSIUM CHLORIDE 1500 MG/1
1 TABLET, FILM COATED, EXTENDED RELEASE ORAL DAILY
Qty: 30 TABLET | Refills: 0 | Status: SHIPPED | OUTPATIENT
Start: 2018-05-18 | End: 2018-06-21 | Stop reason: HOSPADM

## 2018-05-18 RX ORDER — APIXABAN 2.5 MG/1
2.5 TABLET, FILM COATED ORAL 2 TIMES DAILY
Qty: 60 TABLET | Refills: 0 | Status: SHIPPED | OUTPATIENT
Start: 2018-05-18

## 2018-05-18 RX ORDER — DILTIAZEM HYDROCHLORIDE 120 MG/1
120 CAPSULE, COATED, EXTENDED RELEASE ORAL DAILY
Qty: 30 CAPSULE | Refills: 0 | Status: SHIPPED | OUTPATIENT
Start: 2018-05-18 | End: 2018-06-21 | Stop reason: HOSPADM

## 2018-05-18 RX ORDER — SERTRALINE HYDROCHLORIDE 25 MG/1
25 TABLET, FILM COATED ORAL DAILY
Qty: 30 TABLET | Refills: 0 | Status: SHIPPED | OUTPATIENT
Start: 2018-05-18

## 2018-05-18 RX ORDER — QUETIAPINE FUMARATE 25 MG/1
25 TABLET, FILM COATED ORAL
Qty: 30 TABLET | Refills: 0 | Status: SHIPPED | OUTPATIENT
Start: 2018-05-18 | End: 2018-06-18

## 2018-05-18 RX ORDER — METOPROLOL SUCCINATE 25 MG/1
25 TABLET, EXTENDED RELEASE ORAL DAILY
Qty: 30 TABLET | Refills: 0 | Status: SHIPPED | OUTPATIENT
Start: 2018-05-18 | End: 2018-06-21 | Stop reason: HOSPADM

## 2018-06-18 ENCOUNTER — HOSPITAL ENCOUNTER (INPATIENT)
Facility: HOSPITAL | Age: 83
LOS: 3 days | Discharge: HOSPICE | DRG: 315 | End: 2018-06-21
Attending: EMERGENCY MEDICINE | Admitting: INTERNAL MEDICINE
Payer: COMMERCIAL

## 2018-06-18 DIAGNOSIS — N17.9 ACUTE RENAL FAILURE (HCC): Primary | ICD-10-CM

## 2018-06-18 DIAGNOSIS — E87.6 HYPOKALEMIA: ICD-10-CM

## 2018-06-18 DIAGNOSIS — R19.5 HEME POSITIVE STOOL: ICD-10-CM

## 2018-06-18 DIAGNOSIS — I95.9 HYPOTENSION: ICD-10-CM

## 2018-06-18 DIAGNOSIS — I50.32 CHRONIC DIASTOLIC CHF (CONGESTIVE HEART FAILURE) (HCC): ICD-10-CM

## 2018-06-18 PROBLEM — R55 SYNCOPE: Status: ACTIVE | Noted: 2018-06-18

## 2018-06-18 PROBLEM — I95.0 IDIOPATHIC HYPOTENSION: Status: ACTIVE | Noted: 2018-06-18

## 2018-06-18 LAB
ALBUMIN SERPL BCP-MCNC: 3.3 G/DL (ref 3.5–5)
ALP SERPL-CCNC: 79 U/L (ref 46–116)
ALT SERPL W P-5'-P-CCNC: 28 U/L (ref 12–78)
ANION GAP SERPL CALCULATED.3IONS-SCNC: 5 MMOL/L (ref 4–13)
APTT PPP: 30 SECONDS (ref 24–36)
AST SERPL W P-5'-P-CCNC: 25 U/L (ref 5–45)
ATRIAL RATE: 170 BPM
ATRIAL RATE: 375 BPM
BACTERIA UR QL AUTO: ABNORMAL /HPF
BASOPHILS # BLD AUTO: 0.05 THOUSANDS/ΜL (ref 0–0.1)
BASOPHILS NFR BLD AUTO: 1 % (ref 0–1)
BILIRUB SERPL-MCNC: 0.8 MG/DL (ref 0.2–1)
BILIRUB UR QL STRIP: NEGATIVE
BUN SERPL-MCNC: 93 MG/DL (ref 5–25)
CALCIUM SERPL-MCNC: 10.1 MG/DL (ref 8.3–10.1)
CHLORIDE SERPL-SCNC: 94 MMOL/L (ref 100–108)
CLARITY UR: CLEAR
CO2 SERPL-SCNC: 40 MMOL/L (ref 21–32)
COLOR UR: YELLOW
CREAT SERPL-MCNC: 3.3 MG/DL (ref 0.6–1.3)
EOSINOPHIL # BLD AUTO: 0.03 THOUSAND/ΜL (ref 0–0.61)
EOSINOPHIL NFR BLD AUTO: 0 % (ref 0–6)
ERYTHROCYTE [DISTWIDTH] IN BLOOD BY AUTOMATED COUNT: 13.8 % (ref 11.6–15.1)
GFR SERPL CREATININE-BSD FRML MDRD: 12 ML/MIN/1.73SQ M
GLUCOSE SERPL-MCNC: 165 MG/DL (ref 65–140)
GLUCOSE UR STRIP-MCNC: NEGATIVE MG/DL
HCT VFR BLD AUTO: 44.4 % (ref 34.8–46.1)
HGB BLD-MCNC: 14.4 G/DL (ref 11.5–15.4)
HGB UR QL STRIP.AUTO: NEGATIVE
IMM GRANULOCYTES # BLD AUTO: 0.05 THOUSAND/UL (ref 0–0.2)
IMM GRANULOCYTES NFR BLD AUTO: 1 % (ref 0–2)
INR PPP: 1.43 (ref 0.86–1.17)
KETONES UR STRIP-MCNC: NEGATIVE MG/DL
LEUKOCYTE ESTERASE UR QL STRIP: NEGATIVE
LYMPHOCYTES # BLD AUTO: 1.18 THOUSANDS/ΜL (ref 0.6–4.47)
LYMPHOCYTES NFR BLD AUTO: 12 % (ref 14–44)
MCH RBC QN AUTO: 31.2 PG (ref 26.8–34.3)
MCHC RBC AUTO-ENTMCNC: 32.4 G/DL (ref 31.4–37.4)
MCV RBC AUTO: 96 FL (ref 82–98)
MONOCYTES # BLD AUTO: 0.77 THOUSAND/ΜL (ref 0.17–1.22)
MONOCYTES NFR BLD AUTO: 8 % (ref 4–12)
NEUTROPHILS # BLD AUTO: 8 THOUSANDS/ΜL (ref 1.85–7.62)
NEUTS SEG NFR BLD AUTO: 78 % (ref 43–75)
NITRITE UR QL STRIP: NEGATIVE
NON-SQ EPI CELLS URNS QL MICRO: ABNORMAL /HPF
NRBC BLD AUTO-RTO: 0 /100 WBCS
OTHER STN SPEC: ABNORMAL
PH UR STRIP.AUTO: 6 [PH] (ref 4.5–8)
PLATELET # BLD AUTO: 195 THOUSANDS/UL (ref 149–390)
PMV BLD AUTO: 11.4 FL (ref 8.9–12.7)
POTASSIUM SERPL-SCNC: 2.4 MMOL/L (ref 3.5–5.3)
PROT SERPL-MCNC: 7.4 G/DL (ref 6.4–8.2)
PROT UR STRIP-MCNC: ABNORMAL MG/DL
PROTHROMBIN TIME: 16.6 SECONDS (ref 11.8–14.2)
QRS AXIS: 47 DEGREES
QRS AXIS: 56 DEGREES
QRSD INTERVAL: 88 MS
QRSD INTERVAL: 90 MS
QT INTERVAL: 444 MS
QT INTERVAL: 452 MS
QTC INTERVAL: 512 MS
QTC INTERVAL: 565 MS
RBC # BLD AUTO: 4.62 MILLION/UL (ref 3.81–5.12)
RBC #/AREA URNS AUTO: ABNORMAL /HPF
SODIUM SERPL-SCNC: 139 MMOL/L (ref 136–145)
SP GR UR STRIP.AUTO: 1.01 (ref 1–1.03)
T WAVE AXIS: 213 DEGREES
T WAVE AXIS: 234 DEGREES
UROBILINOGEN UR QL STRIP.AUTO: 0.2 E.U./DL
VENTRICULAR RATE: 80 BPM
VENTRICULAR RATE: 94 BPM
WBC # BLD AUTO: 10.08 THOUSAND/UL (ref 4.31–10.16)
WBC #/AREA URNS AUTO: ABNORMAL /HPF

## 2018-06-18 PROCEDURE — 82272 OCCULT BLD FECES 1-3 TESTS: CPT

## 2018-06-18 PROCEDURE — 93010 ELECTROCARDIOGRAM REPORT: CPT | Performed by: INTERNAL MEDICINE

## 2018-06-18 PROCEDURE — 87040 BLOOD CULTURE FOR BACTERIA: CPT | Performed by: EMERGENCY MEDICINE

## 2018-06-18 PROCEDURE — 96360 HYDRATION IV INFUSION INIT: CPT

## 2018-06-18 PROCEDURE — 80053 COMPREHEN METABOLIC PANEL: CPT | Performed by: EMERGENCY MEDICINE

## 2018-06-18 PROCEDURE — 85730 THROMBOPLASTIN TIME PARTIAL: CPT | Performed by: EMERGENCY MEDICINE

## 2018-06-18 PROCEDURE — 85025 COMPLETE CBC W/AUTO DIFF WBC: CPT | Performed by: EMERGENCY MEDICINE

## 2018-06-18 PROCEDURE — 93005 ELECTROCARDIOGRAM TRACING: CPT

## 2018-06-18 PROCEDURE — 36415 COLL VENOUS BLD VENIPUNCTURE: CPT | Performed by: EMERGENCY MEDICINE

## 2018-06-18 PROCEDURE — 99285 EMERGENCY DEPT VISIT HI MDM: CPT

## 2018-06-18 PROCEDURE — 85610 PROTHROMBIN TIME: CPT | Performed by: EMERGENCY MEDICINE

## 2018-06-18 PROCEDURE — 81001 URINALYSIS AUTO W/SCOPE: CPT | Performed by: EMERGENCY MEDICINE

## 2018-06-18 PROCEDURE — 99222 1ST HOSP IP/OBS MODERATE 55: CPT | Performed by: INTERNAL MEDICINE

## 2018-06-18 RX ORDER — POTASSIUM CHLORIDE 14.9 MG/ML
20 INJECTION INTRAVENOUS ONCE
Status: COMPLETED | OUTPATIENT
Start: 2018-06-18 | End: 2018-06-18

## 2018-06-18 RX ORDER — POTASSIUM CHLORIDE 20MEQ/15ML
40 LIQUID (ML) ORAL ONCE
Status: DISCONTINUED | OUTPATIENT
Start: 2018-06-18 | End: 2018-06-21 | Stop reason: HOSPADM

## 2018-06-18 RX ORDER — DOPAMINE HYDROCHLORIDE 160 MG/100ML
1-20 INJECTION, SOLUTION INTRAVENOUS CONTINUOUS
Status: DISCONTINUED | OUTPATIENT
Start: 2018-06-18 | End: 2018-06-18

## 2018-06-18 RX ORDER — MELATONIN
2000 DAILY
Status: DISCONTINUED | OUTPATIENT
Start: 2018-06-19 | End: 2018-06-21 | Stop reason: HOSPADM

## 2018-06-18 RX ORDER — ASCORBIC ACID 500 MG
1000 TABLET ORAL DAILY
Status: DISCONTINUED | OUTPATIENT
Start: 2018-06-19 | End: 2018-06-21 | Stop reason: HOSPADM

## 2018-06-18 RX ORDER — LATANOPROST 50 UG/ML
1 SOLUTION/ DROPS OPHTHALMIC
Status: DISCONTINUED | OUTPATIENT
Start: 2018-06-18 | End: 2018-06-20

## 2018-06-18 RX ORDER — DORZOLAMIDE HYDROCHLORIDE AND TIMOLOL MALEATE 20; 5 MG/ML; MG/ML
1 SOLUTION/ DROPS OPHTHALMIC 2 TIMES DAILY
Status: DISCONTINUED | OUTPATIENT
Start: 2018-06-19 | End: 2018-06-21 | Stop reason: HOSPADM

## 2018-06-18 RX ORDER — ACETAMINOPHEN 325 MG/1
650 TABLET ORAL EVERY 6 HOURS PRN
Status: DISCONTINUED | OUTPATIENT
Start: 2018-06-18 | End: 2018-06-21 | Stop reason: HOSPADM

## 2018-06-18 RX ORDER — ATORVASTATIN CALCIUM 40 MG/1
40 TABLET, FILM COATED ORAL EVERY EVENING
Status: DISCONTINUED | OUTPATIENT
Start: 2018-06-18 | End: 2018-06-21 | Stop reason: HOSPADM

## 2018-06-18 RX ORDER — POTASSIUM CHLORIDE 20 MEQ/1
20 TABLET, EXTENDED RELEASE ORAL
Status: DISCONTINUED | OUTPATIENT
Start: 2018-06-19 | End: 2018-06-20

## 2018-06-18 RX ORDER — MORPHINE SULFATE 2 MG/ML
2 INJECTION, SOLUTION INTRAMUSCULAR; INTRAVENOUS EVERY 4 HOURS PRN
Status: DISCONTINUED | OUTPATIENT
Start: 2018-06-18 | End: 2018-06-21 | Stop reason: HOSPADM

## 2018-06-18 RX ORDER — SODIUM CHLORIDE 9 MG/ML
125 INJECTION, SOLUTION INTRAVENOUS CONTINUOUS
Status: DISCONTINUED | OUTPATIENT
Start: 2018-06-18 | End: 2018-06-19

## 2018-06-18 RX ORDER — ONDANSETRON 2 MG/ML
4 INJECTION INTRAMUSCULAR; INTRAVENOUS EVERY 6 HOURS PRN
Status: DISCONTINUED | OUTPATIENT
Start: 2018-06-18 | End: 2018-06-21 | Stop reason: HOSPADM

## 2018-06-18 RX ORDER — QUETIAPINE FUMARATE 25 MG/1
25 TABLET, FILM COATED ORAL
Status: DISCONTINUED | OUTPATIENT
Start: 2018-06-18 | End: 2018-06-21 | Stop reason: HOSPADM

## 2018-06-18 RX ORDER — DORZOLAMIDE HYDROCHLORIDE AND TIMOLOL MALEATE 20; 5 MG/ML; MG/ML
1 SOLUTION/ DROPS OPHTHALMIC 2 TIMES DAILY
COMMUNITY

## 2018-06-18 RX ADMIN — SODIUM CHLORIDE 1000 ML: 0.9 INJECTION, SOLUTION INTRAVENOUS at 14:37

## 2018-06-18 RX ADMIN — DOPAMINE HYDROCHLORIDE IN DEXTROSE 5 MCG/KG/MIN: 1.6 INJECTION, SOLUTION INTRAVENOUS at 18:43

## 2018-06-18 RX ADMIN — POTASSIUM CHLORIDE 20 MEQ: 200 INJECTION, SOLUTION INTRAVENOUS at 21:00

## 2018-06-18 RX ADMIN — POTASSIUM CHLORIDE 20 MEQ: 200 INJECTION, SOLUTION INTRAVENOUS at 16:23

## 2018-06-18 NOTE — ED NOTES
Update given to daughter with patient permission       Patient daughters states, "thank you for not sticking her anymore for the blood, its not neccessary"         Jacobo Nicholas, FCO  06/18/18 1723

## 2018-06-18 NOTE — H&P
Assessment/Plan     Principal Problem:    Acute kidney injury ([facility] [address] )  Active Problems:    Hypokalemia    Syncope    Idiopathic hypotension    Diabetes mellitus (HCC)    Chronic diastolic CHF (congestive heart failure) (HCC)    Hypertension    PAF (paroxysmal atrial fibrillation) (HCC)    Heme positive stool    Hypothyroidism       1  Acute kidney injury-creatinine 3 3 with prior baseline at 1 1-will consult Nephrology and check renal ultrasound-IV fluids being given suspect this is more due to hypotension related issues    Subjective     Irl Linear is an [address]  female  HPI:  Who was brought from her place of residence after a syncopal episode  They found her blood pressure to be 60 systolic at the assisted-living residence an ambulance was called  When they tried to sit her up she did have a syncopal episode again  She was given 2 L of fluid here in the ER and [place] was placed  Her blood pressure was in the 70 systolic on arrival and currently is in the 90s  Patient was examined and found to have trace heme-positive stools by the ER physician    Patient has reportedly had some loose stools over the last few days but she denies any abdominal pain at present  She denies any recent antibiotic treatment or any other new meds  Patient stool order was apprised of her situation by Dr Ned Sesay the ER physician and relates that she is a DNR level 3  They would not want aggressive measures such as a intubation or CPR  They will allow antibiotic treatment or IV fluids at this point     Past Surgical History:   Procedure Laterality Date    BREAST BIOPSY Bilateral     SENTINEL LYMPH NODE BIOPSY    BREAST SURGERY Bilateral     MASTECTOMY     CATARACT EXTRACTION Left     HYSTERECTOMY      MASTECTOMY Bilateral     TOTAL ABDOMINAL HYSTERECTOMY W/ BILATERAL SALPINGOOPHORECTOMY  1973       Review of Systems   Constitutional: Positive for activity change and fatigue  HENT: Negative for congestion  Respiratory: Negative for cough  Cardiovascular: Negative for chest pain and palpitations  Gastrointestinal: Positive for diarrhea  Negative for abdominal distention and abdominal pain  Loose stools over the last several days  ; heme-positive stool noted in the ER   Genitourinary: Negative for dysuria  All other systems reviewed and are negative  No current facility-administered medications on file prior to encounter        Current Outpatient Prescriptions on File Prior to Encounter   Medication Sig Dispense Refill    acetaminophen (TYLENOL) 325 mg tablet Take 2 tablets (650 mg total) by mouth every 6 (six) hours as needed for mild pain 30 tablet 0    Ascorbic Acid (VITAMIN C) 500 MG CAPS Take 1,000 mg by mouth daily        atorvastatin (LIPITOR) 40 mg tablet TAKE ONE TABLET BY MOUTH ONCE DAILY 90 tablet 3    Calcium Carbonate-Vitamin D 600-400 MG-UNIT per chew tablet Chew 1 tablet daily      cholecalciferol (VITAMIN D3) 1,000 units tablet Take 2,000 Units by mouth daily        diltiazem (CARTIA XT) 120 mg 24 hr capsule Take 1 capsule (120 mg total) by mouth daily 30 capsule 0    ELIQUIS 2 5 MG Take 1 tablet (2 5 mg total) by mouth 2 (two) times a day 60 tablet 0    furosemide (LASIX) 40 mg tablet Take 1 tablet (40 mg total) by mouth 2 (two) times a day 30 tablet 0    latanoprost (XALATAN) 0 005 % ophthalmic solution Apply to eye      levothyroxine 137 mcg tablet Take 1 tablet (137 mcg total) by mouth daily 30 tablet 0    metolazone (ZAROXOLYN) 2 5 mg tablet Take 1 tablet (2 5 mg total) by mouth daily 30 tablet 0    metoprolol succinate (TOPROL-XL) 25 mg 24 hr tablet Take 1 tablet (25 mg total) by mouth daily 30 tablet 0    Potassium Chloride ER 20 MEQ TBCR Take 1 tablet (20 mEq total) by mouth daily 30 tablet 0    QUEtiapine (SEROquel) 25 mg tablet Take 1 tablet (25 mg total) by mouth daily at bedtime for 30 days 30 tablet 0    sertraline (ZOLOFT) 25 mg tablet Take 1 tablet (25 mg total) by mouth daily 30 tablet 0    [DISCONTINUED] ascorbic acid (VITAMIN C) 500 MG tablet Take by mouth      [DISCONTINUED] brimonidine-timolol (COMBIGAN) 0 2-0 5 % Administer 1 drop to both eyes every 12 (twelve) hours      [DISCONTINUED] cephalexin (KEFLEX) 500 mg capsule       [DISCONTINUED] lansoprazole (PREVACID) 30 mg capsule Take 30 mg by mouth daily      [DISCONTINUED] lidocaine (LIDODERM) 5 % Place 1 patch on the skin daily for 30 days Remove & Discard patch within 12 hours or as directed by MD 30 patch 0    [DISCONTINUED] nystatin-triamcinolone (MYCOLOG-II) cream Apply topically 3 (three) times a day      [DISCONTINUED] SANTYL ointment APPLY EVERY OTHER DAY TO right leg wound  1       Objective     BP 96/58 (BP Location: Left arm)   Pulse 87   Temp 98 3 °F (36 8 °C) (Rectal)   Resp 20   Ht 5' 3" (1 6 m)   Wt 49 4 kg (109 lb)   LMP  (LMP Unknown)   SpO2 92%   BMI 19 31 kg/m²     Physical Exam   Constitutional: She is oriented to person, place, and time  She appears well-developed and well-nourished  She appears distressed  Weak confused  HENT:   Head: Normocephalic and atraumatic  Mouth/Throat: Oropharynx is clear and moist    Dry oral mucosa   Eyes: Conjunctivae are normal  Pupils are equal, round, and reactive to light  Left eye exhibits no discharge  Neck: No JVD present  No tracheal deviation present  Cardiovascular: Normal rate and regular rhythm  No murmur heard  Pulmonary/Chest: She has no wheezes  She has no rales  Abdominal: Soft  Bowel sounds are normal  She exhibits no distension  There is no tenderness  Musculoskeletal: She exhibits no edema or tenderness  Neurological: She is alert and oriented to person, place, and time  Coordination normal    Skin: Skin is warm and dry  No rash noted  No erythema  Psychiatric:   Pleasantly confused and cooperative  No she lives in [facility] but think she lives alone whereas she is in a assisted living    No focal motor deficits  Nursing note and vitals reviewed  Nasim Jimenez

## 2018-06-18 NOTE — ED NOTES
Patient sitting up more in bed on own, answering questions more appropriately       Eliseo Javed RN  06/18/18 [id number]

## 2018-06-18 NOTE — ED NOTES
Patient noted to have blood at her rectal area    Hem positive stool     Lenoard Khadijah, FCO  06/18/18 0506

## 2018-06-18 NOTE — ED NOTES
Patient has poor  Vascular access, this RN unable to retrieve blood culture and lactic acid   Judson gray at bedside for attempt blood draw,     Meg Hernandez RN  06/18/18 [id number]

## 2018-06-18 NOTE — ED NOTES
[id number] [facility] notified of BP   Verbal order for 3rd liter of normal saline to be hung     Nasir Scales, FCO  06/18/18 [id number]

## 2018-06-18 NOTE — ED NOTES
Patient had normal saline infusion running from EMS  Per verbal Dr Charley Hayward saline is to be continued      Fluids pressure bagged by FCO Hernandez RN  06/18/18 1764

## 2018-06-18 NOTE — ED PROVIDER NOTES
History  Chief Complaint   Patient presents with    Syncope     Pt present to ED after syncopal episode and low BP  Sent from assisted living facility  Reportedly had syncopal episode and found to have low blood pressure  EMS called  Pt states doesn't feel well, c/o fague abd discomfort, no sob/ford  Not answering many other questions  Does have a hx of dementia        History provided by:  Patient and EMS personnel  History limited by:  Dementia   used: No    Syncope   Episode history:  Single  Most recent episode: Today  Timing:  Unable to specify  Progression:  Unable to specify  Chronicity:  New  Context: normal activity    Witnessed: yes    Relieved by:  Nothing  Worsened by:  Nothing  Ineffective treatments:  None tried  Associated symptoms: malaise/fatigue and nausea    Associated symptoms: no diaphoresis, no difficulty breathing, no fever, no recent fall, no recent injury, no recent surgery, no rectal bleeding, no shortness of breath and no vomiting        Prior to Admission Medications   Prescriptions Last Dose Informant Patient Reported? Taking?    Ascorbic Acid (VITAMIN C) 500 MG CAPS   Yes Yes   Sig: Take 1,000 mg by mouth daily     Calcium Carbonate-Vitamin D 600-400 MG-UNIT per chew tablet   Yes Yes   Sig: Chew 1 tablet daily   ELIQUIS 2 5 MG   No Yes   Sig: Take 1 tablet (2 5 mg total) by mouth 2 (two) times a day   Potassium Chloride ER 20 MEQ TBCR   No Yes   Sig: Take 1 tablet (20 mEq total) by mouth daily   QUEtiapine (SEROquel) 25 mg tablet   No Yes   Sig: Take 1 tablet (25 mg total) by mouth daily at bedtime for 30 days   acetaminophen (TYLENOL) 325 mg tablet   No Yes   Sig: Take 2 tablets (650 mg total) by mouth every 6 (six) hours as needed for mild pain   atorvastatin (LIPITOR) 40 mg tablet   No Yes   Sig: TAKE ONE TABLET BY MOUTH ONCE DAILY   cholecalciferol (VITAMIN D3) 1,000 units tablet   Yes Yes   Sig: Take 2,000 Units by mouth daily     diltiazem (CARTIA XT) 120 mg 24 hr capsule   No Yes   Sig: Take 1 capsule (120 mg total) by mouth daily   dorzolamide-timolol (COSOPT) 22 3-6 8 MG/ML ophthalmic solution   Yes Yes   Sig: Administer 1 drop to the right eye 2 (two) times a day   furosemide (LASIX) 40 mg tablet   No Yes   Sig: Take 1 tablet (40 mg total) by mouth 2 (two) times a day   latanoprost (XALATAN) 0 005 % ophthalmic solution   Yes Yes   Sig: Apply to eye   levothyroxine 137 mcg tablet   No Yes   Sig: Take 1 tablet (137 mcg total) by mouth daily   metolazone (ZAROXOLYN) 2 5 mg tablet   No Yes   Sig: Take 1 tablet (2 5 mg total) by mouth daily   metoprolol succinate (TOPROL-XL) 25 mg 24 hr tablet   No Yes   Sig: Take 1 tablet (25 mg total) by mouth daily   sertraline (ZOLOFT) 25 mg tablet   No Yes   Sig: Take 1 tablet (25 mg total) by mouth daily      Facility-Administered Medications: None       Past Medical History:   Diagnosis Date    Actinic keratoses     Age-related cognitive decline     Anxiety     Athlete's foot     last assessed 5/9/2012    Benign neoplasm of large intestine     Breast CA (HCC)     CHF (congestive heart failure) (HCC)     Chronic pain     Compression fracture of thoracic vertebra, sequela     Dementia     Diabetes mellitus (HCC)     Disease of thyroid gland     Dizziness     Feeling weak     Glaucoma     Hyperglycemia     Hyperlipemia     Hyperlipidemia     Hypertension     last assessed 9/25/2017    Insomnia     Lower leg edema     Paroxysmal A-fib (HCC)     Pneumothorax     Thoracic back pain     TIA (transient ischemic attack)     Tinea pedis     Tuberculosis     Vertigo, benign paroxysmal     Vitamin D deficiency        Past Surgical History:   Procedure Laterality Date    BREAST BIOPSY Bilateral     SENTINEL LYMPH NODE BIOPSY    BREAST SURGERY Bilateral     MASTECTOMY     CATARACT EXTRACTION Left     HYSTERECTOMY      MASTECTOMY Bilateral     TOTAL ABDOMINAL HYSTERECTOMY W/ BILATERAL SALPINGOOPHORECTOMY 80       Family History   Problem Relation Age of Onset    Heart disease Mother     Breast cancer Mother [de-identified]    Heart disease Father     Cancer Father         intestinal, unknown type    Cancer Sister 79        gastric    Cancer Brother     Alcohol abuse Brother     Cancer Brother     Alcohol abuse Brother     Cancer Family     Stroke Family     Thyroid disease Family      I have reviewed and agree with the history as documented  Social History   Substance Use Topics    Smoking status: Never Smoker    Smokeless tobacco: Never Used    Alcohol use No        Review of Systems   Unable to perform ROS: Dementia   Constitutional: Positive for malaise/fatigue  Negative for diaphoresis and fever  Respiratory: Negative for shortness of breath  Cardiovascular: Positive for syncope  Gastrointestinal: Positive for nausea  Negative for vomiting  Physical Exam  Physical Exam   Constitutional: She appears well-developed and well-nourished  HENT:   Nose: Nose normal    Mouth/Throat: Mucous membranes are dry  Eyes: Conjunctivae are normal  Pupils are equal, round, and reactive to light  Neck: Neck supple  Cardiovascular: Normal rate and regular rhythm  Pulmonary/Chest: Effort normal and breath sounds normal    Abdominal: Soft  She exhibits no distension  There is no tenderness  Musculoskeletal: She exhibits no deformity  Neurological: She is alert  Skin: There is pallor  Psychiatric: She has a normal mood and affect  Nursing note and vitals reviewed        Vital Signs  ED Triage Vitals [06/18/18 1340]   Temperature Pulse Respirations Blood Pressure SpO2   98 3 °F (36 8 °C) (!) 107 18 (!) 67/42 95 %      Temp Source Heart Rate Source Patient Position - Orthostatic VS BP Location FiO2 (%)   Rectal Monitor Lying Right arm --      Pain Score       No Pain           Vitals:    06/18/18 2336 06/19/18 0741 06/19/18 2300 06/20/18 0700   BP: (!) 83/51 (!) 83/45 97/55 90/54   Pulse: 91 92 82 90   Patient Position - Orthostatic VS: Lying Lying Lying Lying       Visual Acuity  Visual Acuity      Most Recent Value   L Pupil Size (mm)  3   R Pupil Size (mm)  3          ED Medications  Medications   potassium chloride 10 % oral solution 40 mEq (40 mEq Oral Not Given 6/18/18 1611)   acetaminophen (TYLENOL) tablet 650 mg (650 mg Oral Given 6/20/18 1956)   apixaban (ELIQUIS) tablet 2 5 mg (2 5 mg Oral Given 6/20/18 1714)   sertraline (ZOLOFT) tablet 25 mg (25 mg Oral Given 6/20/18 0917)   atorvastatin (LIPITOR) tablet 40 mg (40 mg Oral Given 6/20/18 1714)   QUEtiapine (SEROquel) tablet 25 mg ( Oral Canceled Entry 6/20/18 2200)   dorzolamide-timolol (COSOPT) 22 3-6 8 MG/ML ophthalmic solution 1 drop (1 drop Right Eye Given 6/20/18 1715)   levothyroxine tablet 137 mcg (137 mcg Oral Given 6/20/18 0623)   ascorbic acid (VITAMIN C) tablet 1,000 mg (1,000 mg Oral Given 6/20/18 0917)   cholecalciferol (VITAMIN D3) tablet 2,000 Units (2,000 Units Oral Given 6/20/18 0917)   ondansetron (ZOFRAN) injection 4 mg (not administered)   morphine injection 2 mg (not administered)   polyethylene glycol (MIRALAX) packet 17 g (17 g Oral Given 6/20/18 0917)   bisacodyl (DULCOLAX) rectal suppository 10 mg (not administered)   potassium chloride (K-DUR,KLOR-CON) CR tablet 40 mEq (40 mEq Oral Given 6/20/18 2013)   dextrose 5 % and sodium chloride 0 45 % with KCl 40 mEq/L infusion (premix) (75 mL/hr Intravenous Not Given 6/20/18 0918)   latanoprost (XALATAN) 0 005 % ophthalmic solution 1 drop (not administered)   sodium chloride 0 9 % bolus 1,000 mL (0 mL Intravenous Stopped 6/18/18 1537)   potassium chloride 20 mEq IVPB (premix) (0 mEq Intravenous Stopped 6/18/18 2321)   potassium chloride 20 mEq IVPB (premix) (0 mEq Intravenous Stopped 6/18/18 2321)   potassium chloride 20 mEq IVPB (premix) (20 mEq Intravenous New Bag 6/20/18 1217)       Diagnostic Studies  Results Reviewed     Procedure Component Value Units Date/Time    Blood culture #2 [[id number]] Collected:  06/18/18 1416    Lab Status:  Preliminary result Specimen:  Blood from Arm, Right Updated:  06/20/18 1801     Blood Culture No Growth at 48 hrs  Urine Microscopic [[id number]]  (Abnormal) Collected:  06/18/18 1420    Lab Status:  Final result Specimen:  Urine from Urine, Indwelling Bangura Catheter Updated:  06/18/18 1632     RBC, UA 0-1 (A) /hpf      WBC, UA None Seen /hpf      Epithelial Cells Occasional /hpf      Bacteria, UA None Seen /hpf      OTHER OBSERVATIONS Renal Tubule Epithelial Cells Present    UA w Reflex to Microscopic w Reflex to Culture [[id number]]  (Abnormal) Collected:  06/18/18 1420    Lab Status:  Final result Specimen:  Urine from Urine, Indwelling Bangura Catheter Updated:  06/18/18 1600     Color, UA Yellow     Clarity, UA Clear     Specific Gravity, UA 1 010     pH, UA 6 0     Leukocytes, UA Negative     Nitrite, UA Negative     Protein, UA Trace (A) mg/dl      Glucose, UA Negative mg/dl      Ketones, UA Negative mg/dl      Urobilinogen, UA 0 2 E U /dl      Bilirubin, UA Negative     Blood, UA Negative    Comprehensive metabolic panel [[id number]]  (Abnormal) Collected:  06/18/18 1417    Lab Status:  Final result Specimen:  Blood from Arm, Right Updated:  06/18/18 1514     Sodium 139 mmol/L      Potassium 2 4 (LL) mmol/L      Chloride 94 (L) mmol/L      CO2 40 (H) mmol/L      Anion Gap 5 mmol/L      BUN 93 (H) mg/dL      Creatinine 3 30 (H) mg/dL      Glucose 165 (H) mg/dL      Calcium 10 1 mg/dL      AST 25 U/L      ALT 28 U/L      Alkaline Phosphatase 79 U/L      Total Protein 7 4 g/dL      Albumin 3 3 (L) g/dL      Total Bilirubin 0 80 mg/dL      eGFR 12 ml/min/1 73sq m     Narrative:         National Kidney Disease Education Program recommendations are as follows:  GFR calculation is accurate only with a steady state creatinine  Chronic Kidney disease less than 60 ml/min/1 73 sq  meters  Kidney failure less than 15 ml/min/1 73 sq  meters      Protime-INR [[id number]]  (Abnormal) Collected:  06/18/18 1417    Lab Status:  Final result Specimen:  Blood from Arm, Right Updated:  06/18/18 1446     Protime 16 6 (H) seconds      INR 1 43 (H)    APTT [[id number]]  (Normal) Collected:  06/18/18 1417    Lab Status:  Final result Specimen:  Blood from Arm, Right Updated:  06/18/18 1446     PTT 30 seconds     CBC and differential [[id number]]  (Abnormal) Collected:  06/18/18 1417    Lab Status:  Final result Specimen:  Blood from Arm, Right Updated:  06/18/18 1434     WBC 10 08 Thousand/uL      RBC 4 62 Million/uL      Hemoglobin 14 4 g/dL      Hematocrit 44 4 %      MCV 96 fL      MCH 31 2 pg      MCHC 32 4 g/dL      RDW 13 8 %      MPV 11 4 fL      Platelets 881 Thousands/uL      nRBC 0 /100 WBCs      Neutrophils Relative 78 (H) %      Immat GRANS % 1 %      Lymphocytes Relative 12 (L) %      Monocytes Relative 8 %      Eosinophils Relative 0 %      Basophils Relative 1 %      Neutrophils Absolute 8 00 (H) Thousands/µL      Immature Grans Absolute 0 05 Thousand/uL      Lymphocytes Absolute 1 18 Thousands/µL      Monocytes Absolute 0 77 Thousand/µL      Eosinophils Absolute 0 03 Thousand/µL      Basophils Absolute 0 05 Thousands/µL                  XR abdomen obstruction series   Final Result by Scott Turner MD (06/19 1435)      There is a large volume of stool in the colon suggesting constipation  Bowel gas pattern is otherwise unremarkable           Workstation performed: [id number]                    Procedures  ECG 12 Lead Documentation  Date/Time: 6/18/2018 2:01 PM  Performed by: Napoleon Melendrez  Authorized by: Napoleon Melendrez     ECG reviewed by me, the ED Provider: yes    Patient location:  ED  Previous ECG:     Previous ECG:  Compared to current    Similarity:  No change  Interpretation:     Interpretation: abnormal    Rate:     ECG rate:  94    ECG rate assessment: normal    Rhythm:     Rhythm: atrial fibrillation    QRS:     QRS axis:  Normal  ST segments:     ST segments:  Non-specific  T waves:     T waves: non-specific               Phone Contacts  ED Phone Contact    ED Course  ED Course as of Jun 20 2019   Mon Jun 18, 2018   [address] Spoke w/ daughter and son-in law  DNR/DNI     Spoke w/ daughter regarding patient  Would need central line for pressors if blood pressure doesn't improve  Daughter doesn't want any heroic measures at this time, only wants peripheral access and not central line  Informed we would be able to do fluids and abx and daughter if fine with that     1841 Pressure down despite adequate fluids  Per daughter's previous instruction, no central line  Will start dopamine peripherally now and inform Dr Laly Dejesus w/ Dr Rishabh Carson  She will call family to discuss continued treatement vs comfort measures    2024 Spoke w/ Alexander Pierson, [address] and informed of conversation w/ daughter earlier and conversation w/ Dr Rishabh Carson  Evidently Dr Rishabh Carson unable to reach family    Alexander Pierson will try again                                MDM  Number of Diagnoses or Management Options  Acute renal failure [facility]): new and requires workup  Hypokalemia: new and requires workup  Hypotension: new and requires workup     Amount and/or Complexity of Data Reviewed  Clinical lab tests: ordered and reviewed  Tests in the radiology section of CPT®: ordered and reviewed  Tests in the medicine section of CPT®: ordered and reviewed  Decide to obtain previous medical records or to obtain history from someone other than the patient: yes  Obtain history from someone other than the patient: yes  Independent visualization of images, tracings, or specimens: yes      CritCare Time    Disposition  Final diagnoses:   Acute renal failure ([facility] [address] )   Hypokalemia   Hypotension     Time reflects when diagnosis was documented in both MDM as applicable and the Disposition within this note     Time User Action Codes Description Comment    6/18/2018  3:37 PM Ara Eubanks Add [N17 9] Acute renal failure ([facility] [address] ) 6/18/2018  3:37 PM Ara Eubanks Add [E87 6] Hypokalemia     6/18/2018  3:37 PM Ara Eubanks Add [I95 9] Hypotension     6/18/2018  5:07 PM Tere Ambrose Add [R19 5] Heme positive stool     6/18/2018  5:07 PM Tere Ambrose Modify [R19 5] Heme positive stool       ED Disposition     ED Disposition Condition Comment    Admit  Case was discussed with Dr Liliana Rossi and the patient's admission status was agreed to be Admission Status: inpatient status to the service of Dr Liliana Rossi           Follow-up Information    None         Current Discharge Medication List      CONTINUE these medications which have NOT CHANGED    Details   acetaminophen (TYLENOL) 325 mg tablet Take 2 tablets (650 mg total) by mouth every 6 (six) hours as needed for mild pain  Qty: 30 tablet, Refills: 0    Associated Diagnoses: Pain      Ascorbic Acid (VITAMIN C) 500 MG CAPS Take 1,000 mg by mouth daily        atorvastatin (LIPITOR) 40 mg tablet TAKE ONE TABLET BY MOUTH ONCE DAILY  Qty: 90 tablet, Refills: 3    Associated Diagnoses: Mixed hyperlipidemia      Calcium Carbonate-Vitamin D 600-400 MG-UNIT per chew tablet Chew 1 tablet daily      cholecalciferol (VITAMIN D3) 1,000 units tablet Take 2,000 Units by mouth daily        diltiazem (CARTIA XT) 120 mg 24 hr capsule Take 1 capsule (120 mg total) by mouth daily  Qty: 30 capsule, Refills: 0    Comments: Please consider 90 day supplies to promote better adherence  Associated Diagnoses: Essential hypertension      dorzolamide-timolol (COSOPT) 22 3-6 8 MG/ML ophthalmic solution Administer 1 drop to the right eye 2 (two) times a day      ELIQUIS 2 5 MG Take 1 tablet (2 5 mg total) by mouth 2 (two) times a day  Qty: 60 tablet, Refills: 0    Associated Diagnoses: Atrial fibrillation, unspecified type (HCC)      furosemide (LASIX) 40 mg tablet Take 1 tablet (40 mg total) by mouth 2 (two) times a day  Qty: 30 tablet, Refills: 0    Associated Diagnoses: Chronic diastolic congestive heart failure (HCC) latanoprost (XALATAN) 0 005 % ophthalmic solution Apply to eye      levothyroxine 137 mcg tablet Take 1 tablet (137 mcg total) by mouth daily  Qty: 30 tablet, Refills: 0    Associated Diagnoses: Acquired hypothyroidism      metolazone (ZAROXOLYN) 2 5 mg tablet Take 1 tablet (2 5 mg total) by mouth daily  Qty: 30 tablet, Refills: 0    Associated Diagnoses: Chronic diastolic CHF (congestive heart failure) ([facility])      metoprolol succinate (TOPROL-XL) 25 mg 24 hr tablet Take 1 tablet (25 mg total) by mouth daily  Qty: 30 tablet, Refills: 0    Associated Diagnoses: Atrial fibrillation, unspecified type ([facility])      Potassium Chloride ER 20 MEQ TBCR Take 1 tablet (20 mEq total) by mouth daily  Qty: 30 tablet, Refills: 0    Associated Diagnoses: Chronic diastolic congestive heart failure ([facility])      QUEtiapine (SEROquel) 25 mg tablet Take 1 tablet (25 mg total) by mouth daily at bedtime for 30 days  Qty: 30 tablet, Refills: 0    Associated Diagnoses: Late onset Alzheimer's disease without behavioral disturbance      sertraline (ZOLOFT) 25 mg tablet Take 1 tablet (25 mg total) by mouth daily  Qty: 30 tablet, Refills: 0    Comments: Please consider 90 day supplies to promote better adherence  Associated Diagnoses: Depression, unspecified depression type           No discharge procedures on file      ED Provider  Electronically Signed by           Sal Hamilton DO  06/20/18 2019

## 2018-06-18 NOTE — ED NOTES
Patient daughter states, "I live three hours away and I won't be physically available until 630pm tomorrow night   I will always be available by my cell phone and if not my house phone "     Eliseo Javed RN  06/18/18 4407

## 2018-06-18 NOTE — ED NOTES
Dr Ceasar John and Dr Tad Dela Cruz notified of failed dysphagia assessment     Mikayla Guy, RN  06/18/18 7312

## 2018-06-18 NOTE — ED NOTES
Dr Tasha Calles notified of repeat failed attempts to draw lactic acid  Per provider lactic acid is to be d/c  Family wants limited treatment for patient        Jl Shipman RN  06/18/18 6174

## 2018-06-18 NOTE — ED NOTES
Patient states, "I'm not feeling much better, but thats okay I'm going to take a nap"     Sylvia Pires, FCO  06/18/18 5708

## 2018-06-19 ENCOUNTER — APPOINTMENT (INPATIENT)
Dept: RADIOLOGY | Facility: HOSPITAL | Age: 83
DRG: 315 | End: 2018-06-19
Payer: COMMERCIAL

## 2018-06-19 ENCOUNTER — TELEPHONE (OUTPATIENT)
Dept: FAMILY MEDICINE CLINIC | Facility: HOSPITAL | Age: 83
End: 2018-06-19

## 2018-06-19 PROBLEM — R10.30 LOWER ABDOMINAL PAIN: Status: ACTIVE | Noted: 2018-06-19

## 2018-06-19 LAB
ATRIAL RATE: 156 BPM
QRS AXIS: 76 DEGREES
QRSD INTERVAL: 86 MS
QT INTERVAL: 336 MS
QTC INTERVAL: 497 MS
T WAVE AXIS: 175 DEGREES
VENTRICULAR RATE: 132 BPM

## 2018-06-19 PROCEDURE — 74022 RADEX COMPL AQT ABD SERIES: CPT

## 2018-06-19 PROCEDURE — 99232 SBSQ HOSP IP/OBS MODERATE 35: CPT | Performed by: INTERNAL MEDICINE

## 2018-06-19 RX ORDER — POLYETHYLENE GLYCOL 3350 17 G/17G
17 POWDER, FOR SOLUTION ORAL DAILY
Status: DISCONTINUED | OUTPATIENT
Start: 2018-06-20 | End: 2018-06-21 | Stop reason: HOSPADM

## 2018-06-19 RX ORDER — SODIUM CHLORIDE 9 MG/ML
100 INJECTION, SOLUTION INTRAVENOUS CONTINUOUS
Status: DISCONTINUED | OUTPATIENT
Start: 2018-06-19 | End: 2018-06-20

## 2018-06-19 RX ORDER — BISACODYL 10 MG
10 SUPPOSITORY, RECTAL RECTAL DAILY PRN
Status: DISCONTINUED | OUTPATIENT
Start: 2018-06-19 | End: 2018-06-21 | Stop reason: HOSPADM

## 2018-06-19 RX ADMIN — VITAMIN D, TAB 1000IU (100/BT) 2000 UNITS: 25 TAB at 10:07

## 2018-06-19 RX ADMIN — SERTRALINE HYDROCHLORIDE 25 MG: 50 TABLET ORAL at 10:07

## 2018-06-19 RX ADMIN — LATANOPROST 1 DROP: 50 SOLUTION OPHTHALMIC at 20:51

## 2018-06-19 RX ADMIN — APIXABAN 2.5 MG: 2.5 TABLET, FILM COATED ORAL at 10:08

## 2018-06-19 RX ADMIN — SODIUM CHLORIDE 75 ML/HR: 0.9 INJECTION, SOLUTION INTRAVENOUS at 00:45

## 2018-06-19 RX ADMIN — POTASSIUM CHLORIDE 20 MEQ: 1500 TABLET, EXTENDED RELEASE ORAL at 10:08

## 2018-06-19 RX ADMIN — ATORVASTATIN CALCIUM 40 MG: 40 TABLET, FILM COATED ORAL at 18:05

## 2018-06-19 RX ADMIN — ACETAMINOPHEN 650 MG: 325 TABLET, FILM COATED ORAL at 20:47

## 2018-06-19 RX ADMIN — QUETIAPINE FUMARATE 25 MG: 25 TABLET ORAL at 20:47

## 2018-06-19 RX ADMIN — DORZOLAMIDE HYDROCHLORIDE AND TIMOLOL MALEATE 1 DROP: 20; 5 SOLUTION/ DROPS OPHTHALMIC at 10:08

## 2018-06-19 RX ADMIN — APIXABAN 2.5 MG: 2.5 TABLET, FILM COATED ORAL at 18:04

## 2018-06-19 RX ADMIN — DORZOLAMIDE HYDROCHLORIDE AND TIMOLOL MALEATE 1 DROP: 20; 5 SOLUTION/ DROPS OPHTHALMIC at 18:05

## 2018-06-19 RX ADMIN — OXYCODONE HYDROCHLORIDE AND ACETAMINOPHEN 1000 MG: 500 TABLET ORAL at 10:07

## 2018-06-19 NOTE — PROGRESS NOTES
I had an extensive talk regarding goals of care with the daughter  She does not want invasive procedures performed even if it were to prolong the life of the patient  I verbally confirmed that we are to discontinue the DA infusion but continue antibiotics and fluids as needed with the goal to keep the patient comfortable and without pain  Will change patient's code status to comfort care and add morphine

## 2018-06-19 NOTE — CASE MANAGEMENT
Initial Clinical Review    Admission: Date/Time/Statement: 6/18/18 @ 1538 Inpatient Written     Orders Placed This Encounter   Procedures    Inpatient Admission (expected length of stay for this patient is greater than two midnights)     Standing Status:   Standing     Number of Occurrences:   1     Order Specific Question:   Admitting Physician     Answer:   Emory Álvarez [[id number]]     Order Specific Question:   Level of Care     Answer:   Med Surg [[id number]]     Order Specific Question:   Bed request comments     Answer:   Telemetry     Order Specific Question:   Estimated length of stay     Answer:   More than 2 Midnights     Order Specific Question:   Certification     Answer:   I certify that inpatient services are medically necessary for this patient for a duration of greater than two midnights  See H&P and MD Progress Notes for additional information about the patient's course of treatment  ED: Date/Time/Mode of Arrival:   ED Arrival Information     Expected Arrival Acuity Means of Arrival Escorted By Service Admission Type    - 6/18/2018 13:34 Emergent Ambulance [facility] Emergency    Arrival Complaint    syncope          Chief Complaint:   Chief Complaint   Patient presents with    Syncope     Pt present to ED after syncopal episode and low BP  History of Illness: Who was brought from her place of residence after a syncopal episode  They found her blood pressure to be 60 systolic at the assisted-living residence an ambulance was called  When they tried to sit her up she did have a syncopal episode again  She was given 2 L of fluid here in the ER and [place] was placed  Her blood pressure was in the 70 systolic on arrival and currently is in the 90s  Patient was examined and found to have trace heme-positive stools by the ER physician   Patient has reportedly had some loose stools over the last few days but she denies any abdominal pain at present                   ED Vital Signs:   ED Triage Vitals [06/18/18 1340]   Temperature Pulse Respirations Blood Pressure SpO2   98 3 °F (36 8 °C) (!) 107 18 (!) 67/42 95 %      Temp Source Heart Rate Source Patient Position - Orthostatic VS BP Location FiO2 (%)   Rectal Monitor Lying Right arm --      Pain Score       No Pain        Wt Readings from Last 1 Encounters:   06/19/18 52 3 kg (115 lb 4 8 oz)       Vital Signs (abnormal):   Date/Time  Temp  Pulse  Resp  BP  SpO2  O2 Device   06/19/18 0741  97 7 °F (36 5 °C)  92  20   83/45  91 %  None (Room air)   06/18/18 2336  97 9 °F (36 6 °C)  91  20   83/51  97 %  Nasal cannula   06/18/18 2240  98 °F (36 7 °C)   124   24   77/53   80 %  --   06/18/18 2200  --  100  20   74/48  97 %  None (Room air)   06/18/18 2130  --  100  20   79/46  97 %  --   06/18/18 2116  --  57  20   79/47  97 %  --   06/18/18 2115  --   111  17  95/52  99 %  --   06/18/18 2045  --  98  18   84/54  100 %  --   06/18/18 1945  --   109  21  120/77  100 %  --   06/18/18 1915  --   120   33  120/60  92 %  --   06/18/18 1700  --  85  --   69/44  95 %  --   06/18/18 1655  --  81  --   68/40  96 %  --   06/18/18 1645  --  90  --   65/37  93 %  --   06/18/18 1630  --  66  --   62/34  97 %  --   06/18/18 1600  --  81  22   85/53  94 %  --   06/18/18 1545  --  65   23  94/53  96 %  --   06/18/18 1532  --  --  --   69/48  --  --   06/18/18 1528  --  --  --   70/50  --  --   06/18/18 1500  --  76  17   75/46  96 %  --   06/18/18 1445  --  78   23   73/44  95 %  --   06/18/18 1430  --  96  22   75/45  91 %  --   06/18/18 1415  --  84   39   81/45  96 %  --   06/18/18 1400  --  67  16   89/50   87 %  --   06/18/18 1345  --  84  20   82/51  98 %  None (Room air)   06/18/18 1340  98 3 °F (36 8 °C)   107  18   67/42  95 %  None (Room air)     Abnormal Labs/Diagnostic Test Results:   Neutrophils Relative 78     Lymphocytes Relative 12     Neutrophils Absolute 8 00       Potassium 2 4     Chloride 94     CO2 40     BUN 93     Creatinine 3 30 Glucose 165       Protime 16 6     INR 1 43       Protein, UA Trace       RBC, UA 0-1      ED Treatment:   Medication Administration from 06/18/2018 1334 to 06/18/2018 2211       Date/Time Order Dose Route Action     06/18/2018 1437 sodium chloride 0 9 % bolus 1,000 mL 1,000 mL Intravenous New Bag     06/18/2018 1611 potassium chloride 10 % oral solution 40 mEq 40 mEq Oral Not Given     06/18/2018 1623 potassium chloride 20 mEq IVPB (premix) 20 mEq Intravenous New Bag     06/18/2018 2100 potassium chloride 20 mEq IVPB (premix) 20 mEq Intravenous New Bag     06/18/2018 1843 DOPamine (INTROPIN) 400 mg in 250 mL infusion (premix) 5 mcg/kg/min Intravenous New Bag          Past Medical/Surgical History:    Active Ambulatory Problems     Diagnosis Date Noted    Transient ischemic attack (TIA)     Benign paroxysmal positional vertigo     Hyperlipidemia     Glaucoma     Hypothyroidism     Dementia     Breast cancer (HCC)     Chronic pain     Diabetes mellitus ([facility] )     Atrial fibrillation with RVR (new onset) 03/01/2017    Hyponatremia 03/01/2017    Constipation 03/01/2017    Compression fracture of thoracic vertebra, sequela 03/01/2017    Hypokalemia 03/02/2017    Chronic diastolic CHF (congestive heart failure) ([facility] [facility] ) 03/03/2017    Hypertension 03/03/2017    Acute metabolic encephalopathy 79/66/2344    Actinic keratosis 10/19/2015    Age-related cognitive decline 03/21/2016    Anxiety 09/07/2012    Vitamin D deficiency 05/09/2012    Venous ulcer of right leg ([facility] ) 12/29/2017    Tinea pedis of right foot 08/25/2017    Thoracic neuralgia 05/31/2017    Thoracic back pain 12/15/2014    Spondylosis of lumbar region without myelopathy or radiculopathy 11/26/2013    Postural dizziness with presyncope 09/08/2017    PAF (paroxysmal atrial fibrillation) ([facility] ) 03/16/2017    Lower leg edema 04/17/2017    Insomnia 08/06/2012    Hyperglycemia 05/09/2012    Depression with anxiety 10/13/2017     Resolved Ambulatory Problems     Diagnosis Date Noted    No Resolved Ambulatory Problems     Past Medical History:   Diagnosis Date    Actinic keratoses     Age-related cognitive decline     Anxiety     Athlete's foot     Benign neoplasm of large intestine     Breast CA (HCC)     CHF (congestive heart failure) (HCC)     Chronic pain     Compression fracture of thoracic vertebra, sequela     Dementia     Diabetes mellitus (HCC)     Disease of thyroid gland     Dizziness     Feeling weak     Glaucoma     Hyperglycemia     Hyperlipemia     Hyperlipidemia     Hypertension     Insomnia     Lower leg edema     Paroxysmal A-fib (HCC)     Pneumothorax     Thoracic back pain     TIA (transient ischemic attack)     Tinea pedis     Tuberculosis     Vertigo, benign paroxysmal     Vitamin D deficiency        Admitting Diagnosis: Hypokalemia [E87 6]  Syncope [R55]  Hypotension [I95 9]  Acute renal failure (HCC) [N17 9]  Heme positive stool [R19 5]    Age/Sex: 80 y o  female    Assessment/Plan:   Principal Problem:    Acute kidney injury ([facility] [address] )  Active Problems:    Hypokalemia    Syncope    Idiopathic hypotension    Diabetes mellitus (HCC)    Chronic diastolic CHF (congestive heart failure) (HCC)    Hypertension    PAF (paroxysmal atrial fibrillation) (HCC)    Heme positive stool    Hypothyroidism     1   Acute kidney injury-creatinine 3 3 with prior baseline at 1 1-will consult Nephrology and check renal ultrasound-IV fluids being given suspect this is more due to hypotension related issues     Admission Orders:  OOB with assist  Daily weights  XR Abd Obstr series  Blood and stool cxs pending  EKG  Bladder scan  Pt, ot, speech  Consult nephrology    Scheduled Meds:   Current Facility-Administered Medications:  acetaminophen 650 mg Oral Q6H PRN   apixaban 2 5 mg Oral BID   ascorbic acid 1,000 mg Oral Daily   atorvastatin 40 mg Oral QPM   cholecalciferol 2,000 Units Oral Daily   dorzolamide-timolol 1 drop Right Eye BID   latanoprost 1 drop Both Eyes HS   levothyroxine 137 mcg Oral Early Morning   morphine injection 2 mg Intravenous Q4H PRN   ondansetron 4 mg Intravenous Q6H PRN   potassium chloride 20 mEq Oral Daily With Breakfast   potassium chloride 40 mEq Oral Once   QUEtiapine 25 mg Oral HS   sertraline 25 mg Oral Daily   sodium chloride 75 mL/hr Intravenous Continuous     Continuous Infusions:   sodium chloride 75 mL/hr Last Rate: 75 mL/hr (06/19/18 0045)     PRN Meds:   acetaminophen    morphine injection    ondansetron    Thank you,  [address] [facility] in the [facility] by Hugo Bonner for 2017  Network Utilization Review Department  Phone: [phone]; Fax [phone]  ATTENTION: The Network Utilization Review Department is now centralized for our 7 Facilities  Make a note that we have a new phone and fax numbers for our Department  Please call with any questions or concerns to [phone] and carefully follow the prompts so that you are directed to the right person  All voicemails are confidential  Fax any determinations, approvals, denials, and requests for initial or continue stay review clinical to [phone]  Due to HIGH CALL volume, it would be easier if you could please send faxed requests to expedite your requests and in part, help us provide discharge notifications faster

## 2018-06-19 NOTE — SOCIAL WORK
Attempted to meet with patient who is confused  Called and spoke with patients daughter Meryl Webster  Explained role of care management  Patient shares a room at [address] [place] [address] with her spouse  She has been there since 5/18/2018  As per daughter, she is independent adl's, uses a RW to ambulate, goes to DR for meals  Plan is for her to return to [facility] at discharge  As per daughter, if patient conditions does not improve, she would be interested in hospice as she wants patient status to be DNR, comfort care only  Will follow and provide services as needed

## 2018-06-19 NOTE — ED NOTES
Per [facility] spoke to family  Per Garret Jimenez family is requesting the stopping of dopamine        Manuela Armstrong RN  06/18/18 7660

## 2018-06-19 NOTE — PROGRESS NOTES
Progress Note - Wilian Dominguez 80 y o  female MRN: [id number]    Unit/Bed#: [address] [place] [address] [id number] Encounter: [id number]      Assessment:  Principal Problem:    Acute kidney injury [facility])  Active Problems:    Hypokalemia    Syncope    Idiopathic hypotension    Diabetes mellitus ([address] )    Chronic diastolic CHF (congestive heart failure) ([facility])    Hypertension    PAF (paroxysmal atrial fibrillation) ([facility])    Heme positive stool    Hypothyroidism    Lower abdominal pain  Resolved Problems:    * No resolved hospital problems  *        · Plan:   ·  Acute kidney injury-in light of recent diarrhea likely due to prerenal/dehydration issues-will repeat labs this morning and continue on IV fluids-was 3 3 on admission  · Abdominal pain-more alert today and is complaining of pain in the lower abdomen-will check an obstruction series and consideration for CT scan  · Hypotension-this has i persisted and patient had been on dopamine briefly last evening but then on discussion with daughter with [place] last evening , she is designated as comfort measures level 4-will given additional IV fluid bolus this morning  · Diabetes mellitus type 2-will check sugars this morning  Subjective:   Patient opens eyes briefly and denies shortness of breath when question  She does complain of some lower abdominal tenderness  ROS  Comprehensive system review negative other than noted above    Objective:     Vitals: Blood pressure (!) 83/45, pulse 92, temperature 97 7 °F (36 5 °C), temperature source Oral, resp  rate 20, height 5' 3" (1 6 m), weight 52 3 kg (115 lb 4 8 oz), SpO2 91 %  ,Body mass index is 20 42 kg/m²    Current Facility-Administered Medications   Medication Dose Route Frequency Provider Last Rate Last Dose    acetaminophen (TYLENOL) tablet 650 mg  650 mg Oral Q6H PRN Bowie Marina, DO        apixaban (ELIQUIS) tablet 2 5 mg  2 5 mg Oral BID Yaya Marina, DO        ascorbic acid (VITAMIN C) tablet 1,000 mg  1,000 mg Oral Daily Tere Ambrose DO        atorvastatin (LIPITOR) tablet 40 mg  40 mg Oral QPM Tere Fly, DO        cholecalciferol (VITAMIN D3) tablet 2,000 Units  2,000 Units Oral Daily Tere Fly, DO        dorzolamide-timolol (COSOPT) 22 3-6 8 MG/ML ophthalmic solution 1 drop  1 drop Right Eye BID Tere Fly, DO        latanoprost (XALATAN) 0 005 % ophthalmic solution 1 drop  1 drop Both Eyes HS Tere Fly, DO        levothyroxine tablet 137 mcg  137 mcg Oral Early Morning Tere Fly, DO        morphine injection 2 mg  2 mg Intravenous Q4H PRN Thiago Ask PA-C        ondansetron [facility]) injection 4 mg  4 mg Intravenous Q6H PRN Tere Fly, DO        potassium chloride (K-DUR,KLOR-CON) CR tablet 20 mEq  20 mEq Oral Daily With Breakfast Tere Fly, DO        potassium chloride 10 % oral solution 40 mEq  40 mEq Oral Once Ara Eubanks, DO        QUEtiapine (SEROquel) tablet 25 mg  25 mg Oral HS Tere Fly, DO        sertraline (ZOLOFT) tablet 25 mg  25 mg Oral Daily Tere Fly, DO        sodium chloride 0 9 % infusion  75 mL/hr Intravenous Continuous Thiago TITA Arana 75 mL/hr at 06/19/18 0045 75 mL/hr at 06/19/18 0045     Prescriptions Prior to Admission   Medication    acetaminophen (TYLENOL) 325 mg tablet    Ascorbic Acid (VITAMIN C) 500 MG CAPS    atorvastatin (LIPITOR) 40 mg tablet    Calcium Carbonate-Vitamin D 600-400 MG-UNIT per chew tablet    cholecalciferol (VITAMIN D3) 1,000 units tablet    diltiazem (CARTIA XT) 120 mg 24 hr capsule    dorzolamide-timolol (COSOPT) 22 3-6 8 MG/ML ophthalmic solution    ELIQUIS 2 5 MG    furosemide (LASIX) 40 mg tablet    latanoprost (XALATAN) 0 005 % ophthalmic solution    levothyroxine 137 mcg tablet    metolazone (ZAROXOLYN) 2 5 mg tablet    metoprolol succinate (TOPROL-XL) 25 mg 24 hr tablet    Potassium Chloride ER 20 MEQ TBCR    QUEtiapine (SEROquel) 25 mg tablet    sertraline (ZOLOFT) 25 mg tablet         Intake/Output Summary (Last 24 hours) at 06/19/18 0815  Last data filed at 06/19/18 0300   Gross per 24 hour   Intake             1123 ml   Output             1950 ml   Net             -827 ml       Physical Exam:  General appearance: slowed mentation  Neck: no adenopathy, no JVD, supple, symmetrical, trachea midline and thyroid not enlarged, symmetric, no tenderness/mass/nodules  Lungs: clear to auscultation bilaterally  Heart: irregularly irregular rhythm  Abdomen:  Some tenderness in the right lower quadrant and minimally left lower quadrant on exam   Active bowel sounds are noted  Nondistended  Extremities: extremities normal, warm and well-perfused; no cyanosis, clubbing, or edema  Skin: Skin color, texture, turgor normal  No rashes or lesions  Neurologic:  Opens eyes briefly and has underlying confusion but does answer yes and no to questions       Lab, Imaging and other studies: I have personally reviewed pertinent reports  Results from last 7 days  Lab Units 06/18/18  1417   WBC Thousand/uL 10 08   HEMOGLOBIN g/dL 14 4   HEMATOCRIT % 44 4   PLATELETS Thousands/uL 195   NEUTROS PCT % 78*   LYMPHS PCT % 12*   MONOS PCT % 8   EOS PCT % 0       Results from last 7 days  Lab Units 06/18/18  1417   SODIUM mmol/L 139   POTASSIUM mmol/L 2 4*   CHLORIDE mmol/L 94*   CO2 mmol/L 40*   BUN mg/dL 93*   CREATININE mg/dL 3 30*   CALCIUM mg/dL 10 1   TOTAL PROTEIN g/dL 7 4   BILIRUBIN TOTAL mg/dL 0 80   ALK PHOS U/L 79   ALT U/L 28   AST U/L 25   GLUCOSE RANDOM mg/dL 165*     Lab Results   Component Value Date    TROPONINI <0 04 12/21/2014       Results from last 7 days  Lab Units 06/18/18  1417   INR  1 43*     No results found for: JORGE MunguiaLTLAZARUS    Imaging:  Results for orders placed in visit on 12/20/14   XR chest portable    Narrative CHEST portable   INDICATION- Change in mental status   COMPARISON- August 6, 2012 and October 7, 2008   VIEWS-  AP    1 image   FINDINGS-   The cardiomediastinal silhouette is stable    Right hilar prominence,   similar to priors  Calcified granulomata, right midlung and left upper zones  No   developing pulmonary consolidation  No pleural effusion  No   pneumothorax  Bony thorax unremarkable  Amorphous density in the left humeral   head/neck is unchanged, likely an enchondroma or bone infarct  Abandoned EKG electrode monitoring devices overlie the chest    IMPRESSION-   No active pulmonary disease  ____ ____ ____ ____ ____ ____ ____ ____ ____ ____ ____ ____ ____    As per comments in the PACS workstation, findings are concordant with   preliminary interpretation provided by the emergency room physician      Transcribed on- JETHRO Smith MD        Reading Radiologist- JETHRO Mensah MD        Electronically Signed- JETHRO Mensah MD        Released Date Time- 12/21/14 1110      ------------------------------------------------------------------------------   J [id number] [facility] Derek      Results for orders placed in visit on 01/09/15   XR chest pa & lateral    Narrative CHEST - DUAL ENERGY   INDICATION- Dizziness  Shortness breath  COMPARISON- 12/20/2014   VIEWS-  PA (including soft tissue/bone algorithms) and lateral   projections    4 images   FINDINGS-   The cardiomediastinal silhouette is stable  Small bilateral pleural effusions  No pneumothorax  No focal   infiltrates  Stable pulmonary granulomas  Stable sclerotic density in the left humeral head, probably an   enchondroma  IMPRESSION-   Small bilateral pleural effusions     Transcribed on- JETHRO Lomeli MD        Reading Radiologist- EJTHRO Osorio MD        Electronically Signed- JETHRO Osorio MD        Released Date Time- 01/09/15 1611      ------------------------------------------------------------------------------   BALAJI SAINI        PATIENT CENTERED ROUNDS: I have performed rounds with the nursing staff           Dustin Chaudhari, DO

## 2018-06-19 NOTE — SPEECH THERAPY NOTE
Speech Language/Pathology  Pt adm w/ poor BINH/poor, low blood pressure  Now fully awake, alert & improving blood pressure  Took pills & water w/o difficulty  Formal assessment not needed at this time

## 2018-06-20 ENCOUNTER — TELEPHONE (OUTPATIENT)
Dept: FAMILY MEDICINE CLINIC | Facility: HOSPITAL | Age: 83
End: 2018-06-20

## 2018-06-20 LAB
ALBUMIN SERPL BCP-MCNC: 2.3 G/DL (ref 3.5–5)
ALP SERPL-CCNC: 55 U/L (ref 46–116)
ALT SERPL W P-5'-P-CCNC: 19 U/L (ref 12–78)
ANION GAP SERPL CALCULATED.3IONS-SCNC: 4 MMOL/L (ref 4–13)
ANION GAP SERPL CALCULATED.3IONS-SCNC: 4 MMOL/L (ref 4–13)
AST SERPL W P-5'-P-CCNC: 21 U/L (ref 5–45)
BILIRUB SERPL-MCNC: 1.1 MG/DL (ref 0.2–1)
BUN SERPL-MCNC: 41 MG/DL (ref 5–25)
BUN SERPL-MCNC: 41 MG/DL (ref 5–25)
CALCIUM SERPL-MCNC: 8 MG/DL (ref 8.3–10.1)
CALCIUM SERPL-MCNC: 8.1 MG/DL (ref 8.3–10.1)
CHLORIDE SERPL-SCNC: 106 MMOL/L (ref 100–108)
CHLORIDE SERPL-SCNC: 107 MMOL/L (ref 100–108)
CO2 SERPL-SCNC: 32 MMOL/L (ref 21–32)
CO2 SERPL-SCNC: 36 MMOL/L (ref 21–32)
CREAT SERPL-MCNC: 1.28 MG/DL (ref 0.6–1.3)
CREAT SERPL-MCNC: 1.3 MG/DL (ref 0.6–1.3)
ERYTHROCYTE [DISTWIDTH] IN BLOOD BY AUTOMATED COUNT: 14.2 % (ref 11.6–15.1)
GFR SERPL CREATININE-BSD FRML MDRD: 36 ML/MIN/1.73SQ M
GFR SERPL CREATININE-BSD FRML MDRD: 37 ML/MIN/1.73SQ M
GLUCOSE SERPL-MCNC: 217 MG/DL (ref 65–140)
GLUCOSE SERPL-MCNC: 87 MG/DL (ref 65–140)
HCT VFR BLD AUTO: 35.4 % (ref 34.8–46.1)
HGB BLD-MCNC: 11.4 G/DL (ref 11.5–15.4)
MCH RBC QN AUTO: 31.1 PG (ref 26.8–34.3)
MCHC RBC AUTO-ENTMCNC: 32.2 G/DL (ref 31.4–37.4)
MCV RBC AUTO: 97 FL (ref 82–98)
PLATELET # BLD AUTO: 133 THOUSANDS/UL (ref 149–390)
PMV BLD AUTO: 11.1 FL (ref 8.9–12.7)
POTASSIUM SERPL-SCNC: 2.3 MMOL/L (ref 3.5–5.3)
POTASSIUM SERPL-SCNC: 3.7 MMOL/L (ref 3.5–5.3)
PROT SERPL-MCNC: 5.3 G/DL (ref 6.4–8.2)
RBC # BLD AUTO: 3.66 MILLION/UL (ref 3.81–5.12)
SODIUM SERPL-SCNC: 142 MMOL/L (ref 136–145)
SODIUM SERPL-SCNC: 147 MMOL/L (ref 136–145)
WBC # BLD AUTO: 6.02 THOUSAND/UL (ref 4.31–10.16)

## 2018-06-20 PROCEDURE — 80048 BASIC METABOLIC PNL TOTAL CA: CPT | Performed by: INTERNAL MEDICINE

## 2018-06-20 PROCEDURE — G8979 MOBILITY GOAL STATUS: HCPCS

## 2018-06-20 PROCEDURE — 99233 SBSQ HOSP IP/OBS HIGH 50: CPT | Performed by: INTERNAL MEDICINE

## 2018-06-20 PROCEDURE — 97163 PT EVAL HIGH COMPLEX 45 MIN: CPT

## 2018-06-20 PROCEDURE — 80053 COMPREHEN METABOLIC PANEL: CPT | Performed by: INTERNAL MEDICINE

## 2018-06-20 PROCEDURE — 97530 THERAPEUTIC ACTIVITIES: CPT

## 2018-06-20 PROCEDURE — G8980 MOBILITY D/C STATUS: HCPCS

## 2018-06-20 PROCEDURE — G8978 MOBILITY CURRENT STATUS: HCPCS

## 2018-06-20 PROCEDURE — 85027 COMPLETE CBC AUTOMATED: CPT | Performed by: INTERNAL MEDICINE

## 2018-06-20 RX ORDER — LATANOPROST 50 UG/ML
1 SOLUTION/ DROPS OPHTHALMIC
Status: DISCONTINUED | OUTPATIENT
Start: 2018-06-20 | End: 2018-06-21 | Stop reason: HOSPADM

## 2018-06-20 RX ORDER — POTASSIUM CHLORIDE 20 MEQ/1
40 TABLET, EXTENDED RELEASE ORAL 3 TIMES DAILY
Status: DISCONTINUED | OUTPATIENT
Start: 2018-06-20 | End: 2018-06-21

## 2018-06-20 RX ORDER — POTASSIUM CHLORIDE 14.9 MG/ML
20 INJECTION INTRAVENOUS
Status: COMPLETED | OUTPATIENT
Start: 2018-06-20 | End: 2018-06-20

## 2018-06-20 RX ORDER — DEXTROSE, SODIUM CHLORIDE, AND POTASSIUM CHLORIDE 5; .45; .3 G/100ML; G/100ML; G/100ML
75 INJECTION INTRAVENOUS CONTINUOUS
Status: DISCONTINUED | OUTPATIENT
Start: 2018-06-20 | End: 2018-06-21 | Stop reason: HOSPADM

## 2018-06-20 RX ADMIN — LATANOPROST 1 DROP: 50 SOLUTION/ DROPS OPHTHALMIC at 21:00

## 2018-06-20 RX ADMIN — DORZOLAMIDE HYDROCHLORIDE AND TIMOLOL MALEATE 1 DROP: 20; 5 SOLUTION/ DROPS OPHTHALMIC at 17:15

## 2018-06-20 RX ADMIN — ACETAMINOPHEN 650 MG: 325 TABLET, FILM COATED ORAL at 19:56

## 2018-06-20 RX ADMIN — VITAMIN D, TAB 1000IU (100/BT) 2000 UNITS: 25 TAB at 09:17

## 2018-06-20 RX ADMIN — POTASSIUM CHLORIDE 40 MEQ: 1500 TABLET, EXTENDED RELEASE ORAL at 20:13

## 2018-06-20 RX ADMIN — POTASSIUM CHLORIDE 20 MEQ: 200 INJECTION, SOLUTION INTRAVENOUS at 12:17

## 2018-06-20 RX ADMIN — DEXTROSE, SODIUM CHLORIDE, AND POTASSIUM CHLORIDE 75 ML/HR: 5; .45; .3 INJECTION INTRAVENOUS at 09:17

## 2018-06-20 RX ADMIN — APIXABAN 2.5 MG: 2.5 TABLET, FILM COATED ORAL at 17:14

## 2018-06-20 RX ADMIN — APIXABAN 2.5 MG: 2.5 TABLET, FILM COATED ORAL at 09:17

## 2018-06-20 RX ADMIN — POTASSIUM CHLORIDE 20 MEQ: 200 INJECTION, SOLUTION INTRAVENOUS at 09:18

## 2018-06-20 RX ADMIN — POTASSIUM CHLORIDE 40 MEQ: 1500 TABLET, EXTENDED RELEASE ORAL at 16:10

## 2018-06-20 RX ADMIN — DEXTROSE, SODIUM CHLORIDE, AND POTASSIUM CHLORIDE 75 ML/HR: 5; .45; .3 INJECTION INTRAVENOUS at 22:36

## 2018-06-20 RX ADMIN — QUETIAPINE FUMARATE 25 MG: 25 TABLET ORAL at 19:56

## 2018-06-20 RX ADMIN — ATORVASTATIN CALCIUM 40 MG: 40 TABLET, FILM COATED ORAL at 17:14

## 2018-06-20 RX ADMIN — OXYCODONE HYDROCHLORIDE AND ACETAMINOPHEN 1000 MG: 500 TABLET ORAL at 09:17

## 2018-06-20 RX ADMIN — DORZOLAMIDE HYDROCHLORIDE AND TIMOLOL MALEATE 1 DROP: 20; 5 SOLUTION/ DROPS OPHTHALMIC at 09:18

## 2018-06-20 RX ADMIN — POLYETHYLENE GLYCOL 3350 17 G: 17 POWDER, FOR SOLUTION ORAL at 09:17

## 2018-06-20 RX ADMIN — SERTRALINE HYDROCHLORIDE 25 MG: 50 TABLET ORAL at 09:17

## 2018-06-20 RX ADMIN — LEVOTHYROXINE SODIUM 137 MCG: 112 TABLET ORAL at 06:23

## 2018-06-20 NOTE — PROGRESS NOTES
Progress Note - Elissa Dior 8/17/1927, 80 y o  female MRN: [id number]    Unit/Bed#: [address] [place] [address] Encounter: [id number]    Primary Care Provider: Pratima Ulloa MD   Date and time admitted to hospital: 6/18/2018  1:34 PM        * Acute kidney injury [facility])   Assessment & Plan    Patient's acute kidney injury was most likely due to prerenal causes as patient's renal function is much improved after IV hydration:  Creatinine decreased to 1 3 today  I will switch patient's IV fluids to D5 half-normal saline solution with potassium supplement at 75 cc an hour        PAF (paroxysmal atrial fibrillation) ([facility] [address] )   Assessment & Plan    Patient currently is in atrial fibrillation with controlled rates without any rate control medications sets her metoprolol and verapamil are on hold due to hypotension  Since patient's systolic blood pressure still in the low 90s I will hold rate control medications for now  Continue Eliquis for CVA prevention        Chronic diastolic CHF (congestive heart failure) ([facility] [address] )   Assessment & Plan    Patient has no evidence of volume overload will monitor this on IV fluids  For some might and Zaroxolyn are on hold due to hypotension         Hypokalemia   Assessment & Plan    Patient's potassium is 2 3 today  I am giving her IV potassium chloride as well as p  O  potassium chloride and will recheck levels            VTE Prophylaxis: in place    Patient Centered Rounds: I rounded with patient's nurse    Current Length of Stay: 2 day(s)    Current Patient Status: Inpatient    Certification Statement: Pt requires additional inpatient hospital stay due to: see assessment and plan        Subjective:   Patient's nurse reports that patient had no vomiting, diarrhea, patient has small bowel movement yesterday that patient walked to the bathroom  Patient denies any chest pain, palpitations, shortness of breath, lightheadedness, dizziness, nausea, abdominal pain, dysuria      Obstruction series yesterday showed constipation        All other ROS are negative    Objective:     Vitals:   Temp (24hrs), Av 1 °F (36 7 °C), Min:98 °F (36 7 °C), Max:98 1 °F (36 7 °C)    HR:  [82-90] 90  Resp:  [18-20] 20  BP: (90-97)/(54-55) 90/54  SpO2:  [92 %-96 %] 92 %  Body mass index is 20 23 kg/m²  Input and Output Summary (last 24 hours): Intake/Output Summary (Last 24 hours) at 18 0830  Last data filed at 18 0500   Gross per 24 hour   Intake             1120 ml   Output             1975 ml   Net             -855 ml       Physical Exam:     Physical Exam   Constitutional: She appears well-developed  No distress  HENT:   Head: Normocephalic  Oral membranes are dry   Eyes: Conjunctivae are normal    Neck: Neck supple  No JVD present  Cardiovascular: Normal rate  Irregular irregular controlled rate there is no lower extremity edema   Pulmonary/Chest: Effort normal  No respiratory distress  She has no wheezes  She has no rales  Abdominal: Soft  Bowel sounds are normal  She exhibits no distension  There is no tenderness  Musculoskeletal: She exhibits no tenderness  Lymphadenopathy:     She has no cervical adenopathy  Neurological: She is alert  No cranial nerve deficit   strength is normal, she follows commands   Skin: Skin is warm and dry  No rash noted  Psychiatric: She has a normal mood and affect  Vitals reviewed  I personally reviewed labs and imaging reports for today        Last 24 Hours Medication List:     Current Facility-Administered Medications:  acetaminophen 650 mg Oral Q6H PRN Tere Fly, DO   apixaban 2 5 mg Oral BID Tere Fly, DO   ascorbic acid 1,000 mg Oral Daily Tere Fly, DO   atorvastatin 40 mg Oral QPM Tere Fly, DO   bisacodyl 10 mg Rectal Daily PRN Nicole Gull, DO   cholecalciferol 2,000 Units Oral Daily Tere Fly, DO   dextrose 5 % and sodium chloride 0 45 % with KCl 40 mEq/L 75 mL/hr Intravenous Continuous Familia Estrada MD   dorzolamide-timolol 1 drop Right Eye BID Tere Fly, DO   latanoprost 1 drop Both Eyes HS Tere Fly, DO   levothyroxine 137 mcg Oral Early Morning Tere Fly, DO   morphine injection 2 mg Intravenous Q4H PRN Thiago Arana PA-C   ondansetron 4 mg Intravenous Q6H PRN Tere Fly, DO   polyethylene glycol 17 g Oral Daily Tere Fly, DO   potassium chloride 40 mEq Oral TID Susan Thompson MD   potassium chloride 40 mEq Oral Once Ara Eubanks, DO   potassium chloride 40 mEq Intravenous Once Susan Thompson MD   QUEtiapine 25 mg Oral HS Tere Fly, DO   sertraline 25 mg Oral Daily Dustin Chaudhari DO          Today, Patient Was Seen By: Susan Thompson MD    ** Please Note: Dictation voice to text software may have been used in the creation of this document   **

## 2018-06-20 NOTE — PROGRESS NOTES
Pt has begun to become notably confused and upset  Pt states, "I want to go home where I belong, where's my daughter? I don't want to have to watch this for another 24 hours (television)   I don't want to lay here any longer (pt sitting up in chair not reclined)   "  Pt unsure of where she is currently when asked  Pt alert to person only  Disoriented to place, time

## 2018-06-20 NOTE — PHYSICAL THERAPY NOTE
Physical Therapy Evaluation      Patient Active Problem List   Diagnosis    Transient ischemic attack (TIA)    Benign paroxysmal positional vertigo    Hyperlipidemia    Glaucoma    Hypothyroidism    Dementia    Breast cancer ([facility] [address] )    Chronic pain    Diabetes mellitus ([facility] [address] )    Atrial fibrillation with RVR (new onset)    Hyponatremia    Constipation    Compression fracture of thoracic vertebra, sequela    Hypokalemia    Chronic diastolic CHF (congestive heart failure) (HCC)    Hypertension    Acute metabolic encephalopathy    Actinic keratosis    Age-related cognitive decline    Anxiety    Vitamin D deficiency    Venous ulcer of right leg (HCC)    Tinea pedis of right foot    Thoracic neuralgia    Thoracic back pain    Spondylosis of lumbar region without myelopathy or radiculopathy    Postural dizziness with presyncope    PAF (paroxysmal atrial fibrillation) (HCC)    Lower leg edema    Insomnia    Hyperglycemia    Depression with anxiety    Acute kidney injury (HCC)    Syncope    Heme positive stool    Idiopathic hypotension    Lower abdominal pain       Past Medical History:   Diagnosis Date    Actinic keratoses     Age-related cognitive decline     Anxiety     Athlete's foot     last assessed 5/9/2012    Benign neoplasm of large intestine     Breast CA (HCC)     CHF (congestive heart failure) (HCC)     Chronic pain     Compression fracture of thoracic vertebra, sequela     Dementia     Diabetes mellitus (HCC)     Disease of thyroid gland     Dizziness     Feeling weak     Glaucoma     Hyperglycemia     Hyperlipemia     Hyperlipidemia     Hypertension     last assessed 9/25/2017    Insomnia     Lower leg edema     Paroxysmal A-fib (HCC)     Pneumothorax     Thoracic back pain     TIA (transient ischemic attack)     Tinea pedis     Tuberculosis     Vertigo, benign paroxysmal     Vitamin D deficiency        Past Surgical History:   Procedure Laterality Date    BREAST BIOPSY Bilateral     SENTINEL LYMPH NODE BIOPSY    BREAST SURGERY Bilateral     MASTECTOMY     CATARACT EXTRACTION Left     HYSTERECTOMY      MASTECTOMY Bilateral     TOTAL ABDOMINAL HYSTERECTOMY W/ BILATERAL SALPINGOOPHORECTOMY  1973      06/20/18 1105   Note Type   Note type Eval/Treat   Pain Assessment   Pain Assessment FLACC   Pain Rating: FLACC (Rest) - Face 2   Pain Rating: FLACC (Rest) - Legs 0   Pain Rating: FLACC (Rest) - Activity 0   Pain Rating: FLACC (Rest) - Cry 1   Pain Rating: FLACC (Rest) - Consolability 1   Score: FLACC (Rest) 4   Pain Rating: FLACC (Activity) - Face 2   Pain Rating: FLACC (Activity) - Legs 1   Pain Rating: FLACC (Activity) - Activity 1   Pain Rating: FLACC (Activity) - Cry 1   Pain Rating: FLACC (Activity) - Consolability 1   Score: FLACC (Activity) 6   Home Living   Type of Home Assisted living   Additional Comments lives with  at [facility]   Prior Function   Level of [place] Independent with ADLs and functional mobility; Needs assistance with IADLs   Lives With Spouse   Receives Help From Personal care attendant   ADL Assistance Independent   IADLs Needs assistance   Falls in the last 6 months 0   Comments amb to dining room with RW   Restrictions/Precautions   Weight Bearing Precautions Per Order No   Other Precautions Multiple lines; Fall Risk;Pain;Hard of hearing;Visual impairment   General   Family/Caregiver Present (daughter)   Cognition   Overall Cognitive Status Impaired   Arousal/Participation Arousable   Orientation Level Oriented to person   Following Commands Follows one step commands with increased time or repetition   RLE Assessment   RLE Assessment WFL   LLE Assessment   LLE Assessment WFL   Bed Mobility   Additional Comments received/positioned OOB   Transfers   Sit to Stand 4  Minimal assistance   Additional items Armrests; Increased time required;Verbal cues   Stand to Sit 4  Minimal assistance   Additional items Assist x 1;Verbal cues; Increased time required   Ambulation/Elevation   Gait pattern Improper Weight shift;Narrow SAMMI; Inconsistent america; Excessively slow   Gait Assistance 4  Minimal assist   Additional items Verbal cues   Assistive Device Other (Comment)  (B/L HHA)   Distance 15ft   Balance   Static Sitting Fair   Dynamic Sitting Fair   Static Standing Poor   Dynamic Standing Poor   Endurance Deficit   Endurance Deficit Yes   Endurance Deficit Description pain, generalized deconditioning   Activity Tolerance   Activity Tolerance Patient limited by fatigue;Patient limited by pain   Nurse Made Aware RN consented to treatment   Assessment   Prognosis Poor   Problem List Decreased endurance; Impaired balance;Decreased mobility; Decreased cognition; Impaired judgement;Decreased safety awareness;Decreased skin integrity;Pain   Assessment Pt is 80 y o  female seen for PT evaluation s/p admit to [facility] on 6/18/2018 w/ Acute kidney injury ([facility] [address] )  PT consulted to assess pt's functional mobility and d/c needs  Order placed for PT eval and tx, w/ up w/ A order  Comorbidities affecting pt's physical performance at time of assessment include those as noted above  PTA, pt was ambulates household distances, resident of [facility] and lives with  at [facility], use RW to amb to DR for dinner  Personal factors affecting pt at time of IE include: ambulating w/ assistive device, inability to ambulate household distances, inability to navigate community distances, decreased cognition, hearing impairments, decreased initiation and engagement and inability to perform ADLs  Please find objective findings from PT assessment regarding body systems outlined above with impairments and limitations including weakness, impaired balance, decreased endurance, gait deviations, pain, decreased activity tolerance, decreased functional mobility tolerance, decreased safety awareness, fall risk and decreased cognition   The following objective measures performed on IE also reveal limitations: Barthel Index: 25/100  Pt's clinical presentation is currently unstable/unpredictable seen in pt's presentation of above noted impairments and deficits  Pt to benefit from continued PT tx to address deficits as defined above and maximize level of functional independent mobility and consistency HOWEVER after prolonged conversation with patient's daughter Jeromy uGzman), daughter decided that she would like to pursue palliative care/hospice services  Daughter appreciate of PT services  However would like to discontinue skilled care for her mom at this time  Pt will therefore be discharged from skilled inpatient therapy services as per daughter's request  CRM updated  Goals   Patient Goals None stated  (Daughter requested end of therapy services)   Treatment Day 1   Plan   Treatment/Interventions Spoke to nursing;Spoke to case management; Family   PT Frequency Other (Comment)  (D/C - daughter requested hospice/palliative services)   Recommendation   Recommendation D/C PT  (pt for hospice/palliative care )   Barthel Index   Feeding 5   Bathing 0   Grooming Score 0   Dressing Score 5   Bladder Score 5   Bowels Score 0   Toilet Use Score 5   Transfers (Bed/Chair) Score 5   Mobility (Level Surface) Score 0   Stairs Score 0   Barthel Index Score 25     Treatment session [id number] Pt seen for skilled PT session following evaluation consisting of education in mobility benefits/risks of immobility  Spoke at length with daughter re: pt's current status and ability to progress - daughter at that time, deciding to pursue palliative care/hospice       Tracy Kelly, PT

## 2018-06-20 NOTE — SOCIAL WORK
Spoke with patients daughter Daljit and she is interested in hospice care  Spoke with [facility] and they are okay with patient returning on hospice  Informed that they use [facility]  Patients daughter is agreeable to [facility]  Referral via [facility]

## 2018-06-20 NOTE — ASSESSMENT & PLAN NOTE
Patient currently is in atrial fibrillation with controlled rates without any rate control medications sets her metoprolol and verapamil are on hold due to hypotension  Since patient's systolic blood pressure still in the low 90s I will hold rate control medications for now    Continue Eliquis for CVA prevention

## 2018-06-20 NOTE — ASSESSMENT & PLAN NOTE
Patient's acute kidney injury was most likely due to prerenal causes as patient's renal function is much improved after IV hydration:  Creatinine decreased to 1 3 today    I will switch patient's IV fluids to D5 half-normal saline solution with potassium supplement at 75 cc an hour

## 2018-06-20 NOTE — TELEPHONE ENCOUNTER
Pt was at [facility] and will be discharged to Hospice  [facility] is on your desk  They need it done today  FX: to [facility]  [phone]  She has tremors, syncope, and dehydration

## 2018-06-20 NOTE — ASSESSMENT & PLAN NOTE
Patient's potassium is 2 3 today  I am giving her IV potassium chloride as well as p    O  potassium chloride and will recheck levels

## 2018-06-20 NOTE — ASSESSMENT & PLAN NOTE
Patient has no evidence of volume overload will monitor this on IV fluids    For some might and Zaroxolyn are on hold due to hypotension

## 2018-06-20 NOTE — PLAN OF CARE
Problem: PHYSICAL THERAPY ADULT  Goal: Performs mobility at highest level of function for planned discharge setting  See evaluation for individualized goals  Treatment/Interventions: Spoke to nursing, Spoke to case management, Family          See flowsheet documentation for full assessment, interventions and recommendations  Outcome: Completed Date Met: 06/20/18  Prognosis: Poor  Problem List: Decreased endurance, Impaired balance, Decreased mobility, Decreased cognition, Impaired judgement, Decreased safety awareness, Decreased skin integrity, Pain  Assessment: Pt is 80 y o  female seen for PT evaluation s/p admit to [facility] on 6/18/2018 w/ Acute kidney injury ([facility] [address] )  PT consulted to assess pt's functional mobility and d/c needs  Order placed for PT eval and tx, w/ up w/ A order  Comorbidities affecting pt's physical performance at time of assessment include those as noted above  PTA, pt was ambulates household distances, resident of [facility] and lives with  at [facility], use RW to amb to DR for dinner  Personal factors affecting pt at time of IE include: ambulating w/ assistive device, inability to ambulate household distances, inability to navigate community distances, decreased cognition, hearing impairments, decreased initiation and engagement and inability to perform ADLs  Please find objective findings from PT assessment regarding body systems outlined above with impairments and limitations including weakness, impaired balance, decreased endurance, gait deviations, pain, decreased activity tolerance, decreased functional mobility tolerance, decreased safety awareness, fall risk and decreased cognition  The following objective measures performed on IE also reveal limitations: Barthel Index: 25/100  Pt's clinical presentation is currently unstable/unpredictable seen in pt's presentation of above noted impairments and deficits   Pt to benefit from continued PT tx to address deficits as defined above and maximize level of functional independent mobility and consistency HOWEVER after prolonged conversation with patient's daughter Stanley Saint), daughter decided that she would like to pursue palliative care/hospice services  Daughter appreciate of PT services  However would like to discontinue skilled care for her mom at this time  Pt will therefore be discharged from skilled inpatient therapy services as per daughter's request  CRM updated  Recommendation: D/C PT (pt for hospice/palliative care )          See flowsheet documentation for full assessment

## 2018-06-21 VITALS
OXYGEN SATURATION: 93 % | SYSTOLIC BLOOD PRESSURE: 99 MMHG | RESPIRATION RATE: 20 BRPM | HEART RATE: 125 BPM | DIASTOLIC BLOOD PRESSURE: 74 MMHG | BODY MASS INDEX: 21.17 KG/M2 | HEIGHT: 63 IN | TEMPERATURE: 97.9 F | WEIGHT: 119.49 LBS

## 2018-06-21 PROBLEM — E87.6 HYPOKALEMIA: Status: RESOLVED | Noted: 2017-03-02 | Resolved: 2018-06-21

## 2018-06-21 PROBLEM — R10.30 LOWER ABDOMINAL PAIN: Status: RESOLVED | Noted: 2018-06-19 | Resolved: 2018-06-21

## 2018-06-21 LAB
ANION GAP SERPL CALCULATED.3IONS-SCNC: 5 MMOL/L (ref 4–13)
BUN SERPL-MCNC: 33 MG/DL (ref 5–25)
CALCIUM SERPL-MCNC: 8.1 MG/DL (ref 8.3–10.1)
CHLORIDE SERPL-SCNC: 108 MMOL/L (ref 100–108)
CO2 SERPL-SCNC: 29 MMOL/L (ref 21–32)
CREAT SERPL-MCNC: 1.03 MG/DL (ref 0.6–1.3)
GFR SERPL CREATININE-BSD FRML MDRD: 48 ML/MIN/1.73SQ M
GLUCOSE SERPL-MCNC: 119 MG/DL (ref 65–140)
POTASSIUM SERPL-SCNC: 4.7 MMOL/L (ref 3.5–5.3)
SODIUM SERPL-SCNC: 142 MMOL/L (ref 136–145)

## 2018-06-21 PROCEDURE — 80048 BASIC METABOLIC PNL TOTAL CA: CPT | Performed by: INTERNAL MEDICINE

## 2018-06-21 PROCEDURE — 99238 HOSP IP/OBS DSCHRG MGMT 30/<: CPT | Performed by: INTERNAL MEDICINE

## 2018-06-21 RX ORDER — POTASSIUM CHLORIDE 20 MEQ/1
40 TABLET, EXTENDED RELEASE ORAL 2 TIMES DAILY WITH MEALS
Status: DISCONTINUED | OUTPATIENT
Start: 2018-06-21 | End: 2018-06-21 | Stop reason: HOSPADM

## 2018-06-21 RX ORDER — METOPROLOL TARTRATE 5 MG/5ML
2.5 INJECTION INTRAVENOUS ONCE
Status: COMPLETED | OUTPATIENT
Start: 2018-06-21 | End: 2018-06-21

## 2018-06-21 RX ORDER — METOPROLOL TARTRATE 5 MG/5ML
2.5 INJECTION INTRAVENOUS ONCE
Status: DISCONTINUED | OUTPATIENT
Start: 2018-06-21 | End: 2018-06-21 | Stop reason: HOSPADM

## 2018-06-21 RX ORDER — POTASSIUM CHLORIDE 20 MEQ/1
40 TABLET, EXTENDED RELEASE ORAL 2 TIMES DAILY WITH MEALS
Refills: 0
Start: 2018-06-21

## 2018-06-21 RX ADMIN — APIXABAN 2.5 MG: 2.5 TABLET, FILM COATED ORAL at 08:48

## 2018-06-21 RX ADMIN — LEVOTHYROXINE SODIUM 137 MCG: 112 TABLET ORAL at 06:56

## 2018-06-21 RX ADMIN — OXYCODONE HYDROCHLORIDE AND ACETAMINOPHEN 1000 MG: 500 TABLET ORAL at 08:48

## 2018-06-21 RX ADMIN — METOPROLOL TARTRATE 2.5 MG: 1 INJECTION, SOLUTION INTRAVENOUS at 08:46

## 2018-06-21 RX ADMIN — VITAMIN D, TAB 1000IU (100/BT) 2000 UNITS: 25 TAB at 08:49

## 2018-06-21 RX ADMIN — POLYETHYLENE GLYCOL 3350 17 G: 17 POWDER, FOR SOLUTION ORAL at 08:47

## 2018-06-21 RX ADMIN — SERTRALINE HYDROCHLORIDE 25 MG: 50 TABLET ORAL at 08:48

## 2018-06-21 NOTE — PROGRESS NOTES
Progress Note - Pawan Singh 8/17/1927, 80 y o  female MRN: [id number]    Unit/Bed#: [address] [place], [place] [address] Encounter: [id number]    Primary Care Provider: Jeimy Wells MD   Date and time admitted to hospital: 6/18/2018  1:34 PM        Syncope   Assessment & Plan    Likely due to her hypotension  Will check orthostatic readings        * Acute kidney injury [facility])   Assessment & Plan    Patient's acute kidney injury was most likely due to prerenal causes as patient's renal function is much improved after IV hydration:  Will continue on IV fluids as she is relatively hypotensive-systolic in the 51O and her Lasix and metolazone had been on hold        PAF (paroxysmal atrial fibrillation) [facility])   Assessment & Plan    Patient currently is in atrial fibrillation with increased rates at 120-130 this morning-will give 1 dose of IV beta-blocker and restart Lopressor at a lower dose 12 5 mg daily diltiazem is also on hold  May need to consider for amiodarone if not improving-continue on telemetry        Chronic diastolic CHF (congestive heart failure) [facility])   Assessment & Plan    Patient has no evidence of volume overload will monitor this on IV fluids  Diuretics are on hold due to hypotension        Diabetes mellitus [facility])   Assessment & Plan    Lab Results   Component Value Date    HGBA1C 6 3 (H) 06/13/2017       No results for input(s): POCGLU in the last 72 hours  Blood Sugar Average: Last 72 hrs:   will check  glucometer checks-fasting blood sugar 119 this a m          Hypokalemiaresolved as of 6/21/2018   Assessment & Plan    Patient's potassium is improved today to 4 7            VTE Prophylaxis: in place on Eliquis    Patient Centered Rounds: I rounded with patient's nurse    Current Length of Stay: 3 day(s)    Current Patient Status: Inpatient    Certification Statement: Pt requires additional inpatient hospital stay due to: see assessment and plan        Subjective:   Patient denies chest pain or shortness of breath  No abdominal pain complaints today  Is mildly confused but appears at her baseline    All other ROS are negative    Objective:     Vitals:   No data recorded  HR:  [125] 125  Body mass index is 20 23 kg/m²  Input and Output Summary (last 24 hours): Intake/Output Summary (Last 24 hours) at 06/21/18 0804  Last data filed at 06/20/18 1530   Gross per 24 hour   Intake          1974 17 ml   Output              300 ml   Net          1674 17 ml       Physical Exam:     Physical Exam   Constitutional: She appears well-developed and well-nourished  No distress  HENT:   Head: Normocephalic and atraumatic  Mouth/Throat: No oropharyngeal exudate  Eyes: Right eye exhibits no discharge  Left eye exhibits no discharge  No scleral icterus  Neck: Normal range of motion  Neck supple  No JVD present  No tracheal deviation present  No thyromegaly present  Cardiovascular: Normal rate and regular rhythm  Exam reveals no friction rub  No murmur heard  Pulmonary/Chest: Effort normal and breath sounds normal  No respiratory distress  She has no wheezes  She exhibits no tenderness  Abdominal: Soft  She exhibits no distension  There is no tenderness  Musculoskeletal: She exhibits no edema or tenderness  Neurological:   More alert than on initial admit but not oriented to place or time  No tremors noted no focal deficits   Skin: She is not diaphoretic  Nursing note and vitals reviewed  I personally reviewed labs and imaging reports for today        Last 24 Hours Medication List:     Current Facility-Administered Medications:  acetaminophen 650 mg Oral Q6H PRN Tere Fly, DO    apixaban 2 5 mg Oral BID Tere Fly, DO    ascorbic acid 1,000 mg Oral Daily Tere Fly, DO    atorvastatin 40 mg Oral QPM Tere Fly, DO    bisacodyl 10 mg Rectal Daily PRN Brendia Slot, DO    cholecalciferol 2,000 Units Oral Daily Tere Fly, DO    dextrose 5 % and sodium chloride 0 45 % with KCl 40 mEq/L 75 mL/hr Intravenous Continuous Cristobal Estrada MD Last Rate: 75 mL/hr (06/20/18 2236)   dorzolamide-timolol 1 drop Right Eye BID Tere Fly, DO    latanoprost 1 drop Right Eye HS GABI Muñoz    levothyroxine 137 mcg Oral Early Morning Tere Fly, DO    metoprolol 2 5 mg Intravenous Once Alissa Collado, DO    metoprolol tartrate 12 5 mg Oral Daily Tere Fly, DO    morphine injection 2 mg Intravenous Q4H PRN Mary Farley PA-C    ondansetron 4 mg Intravenous Q6H PRN Tere Fly, DO    polyethylene glycol 17 g Oral Daily Tere Fly, DO    potassium chloride 40 mEq Oral BID With Meals Tere Fly, DO    potassium chloride 40 mEq Oral Once Ara Eubanks, DO    QUEtiapine 25 mg Oral HS Tere Fly, DO    sertraline 25 mg Oral Daily Alissa Collado DO           Today, Patient Was Seen By: Alissa Collado DO    ** Please Note: Dictation voice to text software may have been used in the creation of this document   **

## 2018-06-21 NOTE — ASSESSMENT & PLAN NOTE
Patient has no evidence of volume overload will monitor this on IV fluids  Diuretics are on hold due to hypotension

## 2018-06-21 NOTE — PROGRESS NOTES
Occupational Therapy Cancel Note:  OT orders received and chart reviewed  Noted that Pt to be discharged to hospice care  Eval held and will discontinue OT services     ALVIN Pepe/L

## 2018-06-21 NOTE — ASSESSMENT & PLAN NOTE
Lab Results   Component Value Date    HGBA1C 6 3 (H) 06/13/2017       No results for input(s): POCGLU in the last 72 hours  Blood Sugar Average: Last 72 hrs:   will check  glucometer checks-fasting blood sugar 119 this a m

## 2018-06-21 NOTE — SOCIAL WORK
Patient is for discharge today to [facility]   Med to transport at 1100  Called and notified daughter Donita Deal at [address] [facility] at [facility] [place]  Called and obtained auth for ambulance transport  Auth # [id number]

## 2018-06-21 NOTE — PLAN OF CARE
DISCHARGE PLANNING     Discharge to home or other facility with appropriate resources Adequate for Discharge        INFECTION - ADULT     Absence or prevention of progression during hospitalization Adequate for Discharge     Absence of fever/infection during neutropenic period Adequate for Discharge        Knowledge Deficit     Patient/family/caregiver demonstrates understanding of disease process, treatment plan, medications, and discharge instructions Adequate for Discharge        Nutrition/Hydration-ADULT     Nutrient/Hydration intake appropriate for improving, restoring or maintaining nutritional needs Adequate for Discharge        PAIN - ADULT     Verbalizes/displays adequate comfort level or baseline comfort level Adequate for Discharge        Potential for Falls     Patient will remain free of falls Adequate for Discharge        Prexisting or High Potential for Compromised Skin Integrity     Skin integrity is maintained or improved Adequate for Discharge        SAFETY ADULT     Maintain or return to baseline ADL function Adequate for Discharge     Maintain or return mobility status to optimal level Adequate for Discharge

## 2018-06-21 NOTE — DISCHARGE SUMMARY
Discharge Summary - Dominick Olivera 80 y o  female MRN: [id number]    Unit/Bed#: [address] [place], [place] [address] Encounter: [id number]    Admission Date: 6/18/2018     Admitting Diagnosis: Hypokalemia [E87 6]  Syncope [R55]  Hypotension [I95 9]  Acute renal failure ([address] ) [N17 9]  Heme positive stool [R19 5]    HPI:  68-year-old female admitted with syncopal episode and acute renal failure  Consults    Procedures Performed:   Orders Placed This Encounter   Procedures    ED ECG Documentation Only       Hospital Course:  Patient was admitted from assisted living after syncopal episode and found of hypotension and acute renal failure  Initially she was placed on dopamine as well as fluid resuscitation and had some persistent hypotension  Patient has had progressive dementia and continued decline in status despite aggressive medical treatment  Of family discussion by Dr Kimani Guy with her daughter she is now a level 4 comfort care for resuscitation status and they wish her to be transferred back to [address] [place] [address] living with hospice consult and plan for comfort care  She had ongoing tachycardic issues and was restarted on low-dose beta-blockers but diuretics and diltiazem were discontinued due to the hypotension issues  Significant Findings, Care, Treatment and Services Provided:   OBSTRUCTION SERIES     INDICATION:   Diarrhea, abdominal pain      COMPARISON:  3/1/2017     EXAM PERFORMED/VIEWS:  XR ABDOMEN OBSTRUCTION SERIES  Supine and left lateral decubitus views of the abdomen were performed         FINDINGS:     There is a large volume of stool in the colon suggesting constipation  Bowel gas pattern is otherwise unremarkable       No free air beneath the hemidiaphragms       No pathologic calcifications or soft tissue masses evident  Bangura catheter noted      Osseous structures are unremarkable      Examination of the chest reveals a normal cardiomediastinal silhouette    Opacity in the right upper lobe suggestive of scarring  Stable blunting of the bilateral costophrenic angles compatible with pleural thickening      IMPRESSION:     There is a large volume of stool in the colon suggesting constipation  Bowel gas pattern is otherwise unremarkable        Complications: none  Discharge Diagnosis: Principal Problem:    Acute kidney injury ([facility] [address] )  Active Problems:    Syncope    Idiopathic hypotension    Diabetes mellitus (HCC)    Chronic diastolic CHF (congestive heart failure) (HCC)    Hypertension    PAF (paroxysmal atrial fibrillation) (HCC)    Heme positive stool    Hypothyroidism        Condition at Discharge: fair     Discharge instructions/Information to patient and family:   See after visit summary for information provided to patient and family  Provisions for Follow-Up Care:  See after visit summary for information related to follow-up care and any pertinent home health orders  Disposition: Assisted living/personal care at [facility]-  With hospice consult    Planned Readmission: No    Discharge Statement   I spent 25 minutes discharging the patient  This time was spent on the day of discharge  I had direct contact with the patient on the day of discharge  Discharge Medications:  See after visit summary for reconciled discharge medications provided to patient and family          Petty Soares DO

## 2018-06-21 NOTE — ASSESSMENT & PLAN NOTE
Patient's acute kidney injury was most likely due to prerenal causes as patient's renal function is much improved after IV hydration:  Will continue on IV fluids as she is relatively hypotensive-systolic in the 28L and her Lasix and metolazone had been on hold

## 2018-06-21 NOTE — PROGRESS NOTES
ELEONORA pt's daugher at bedside  Daughter reports Pt has a hx of dementia with sundowning  Pt has a hx of becoming uncooperative, combative, and physically aggressive at previous hospital visits  Daughter reports   Pt has started  "getting grumpy" and hallucinating  Gave Seroquel early per daughters request   Got pt ready for bed with new gown and offered commode  Pt appeared to perk up while getting ready for bed  Pt now appears to be sleeping comfortably in bed, no grimace, equal chest rise and fall  Side rails x 2, bed alarm engaged, bed at lowest position, call bell in reach, and bedside table and belongings within reach

## 2018-06-21 NOTE — ASSESSMENT & PLAN NOTE
Patient currently is in atrial fibrillation with increased rates at 120-130 this morning-will give 1 dose of IV beta-blocker and restart Lopressor at a lower dose 12 5 mg daily diltiazem is also on hold    May need to consider for amiodarone if not improving-continue on telemetry

## 2018-06-23 LAB — BACTERIA BLD CULT: NORMAL

## 2018-07-02 ENCOUNTER — TELEPHONE (OUTPATIENT)
Dept: CARDIOLOGY CLINIC | Facility: CLINIC | Age: 83
End: 2018-07-02

## 2018-07-02 NOTE — TELEPHONE ENCOUNTER
Phone call from daughter  Informing Dr Chuck Mcneill her mother Jenn Padron passed away yesterday  Thanks Dr Chuck Mcneill for all his care

## 2020-10-31 NOTE — ED NOTES
Mallampati: Class III - soft palate, base of uvula visible. ASA: Class 3 - patient with severe systemic disease. Patient family verbally expressing concern about doing limited care for mother  States, " I don't want anything heroic done, no central lines, If something goes wrong I just want her out of pain and comfortable "     Patient daughter to speak to Dr Naomi Altman on code status        Duncan Degree, RN  06/18/18 [id number]